# Patient Record
Sex: FEMALE | Race: WHITE | NOT HISPANIC OR LATINO | Employment: OTHER | URBAN - METROPOLITAN AREA
[De-identification: names, ages, dates, MRNs, and addresses within clinical notes are randomized per-mention and may not be internally consistent; named-entity substitution may affect disease eponyms.]

---

## 2017-03-27 ENCOUNTER — OFFICE VISIT (OUTPATIENT)
Dept: UROLOGY | Facility: CLINIC | Age: 77
End: 2017-03-27
Payer: MEDICARE

## 2017-03-27 VITALS — BODY MASS INDEX: 36.29 KG/M2 | WEIGHT: 245 LBS | HEIGHT: 69 IN

## 2017-03-27 DIAGNOSIS — N39.41 URGE INCONTINENCE: ICD-10-CM

## 2017-03-27 DIAGNOSIS — N39.0 RECURRENT UTI: Primary | ICD-10-CM

## 2017-03-27 PROCEDURE — 1159F MED LIST DOCD IN RCRD: CPT | Mod: S$GLB,,, | Performed by: UROLOGY

## 2017-03-27 PROCEDURE — 1125F AMNT PAIN NOTED PAIN PRSNT: CPT | Mod: S$GLB,,, | Performed by: UROLOGY

## 2017-03-27 PROCEDURE — 81002 URINALYSIS NONAUTO W/O SCOPE: CPT | Mod: S$GLB,,, | Performed by: UROLOGY

## 2017-03-27 PROCEDURE — 1160F RVW MEDS BY RX/DR IN RCRD: CPT | Mod: S$GLB,,, | Performed by: UROLOGY

## 2017-03-27 PROCEDURE — 1157F ADVNC CARE PLAN IN RCRD: CPT | Mod: S$GLB,,, | Performed by: UROLOGY

## 2017-03-27 PROCEDURE — 99999 PR PBB SHADOW E&M-EST. PATIENT-LVL III: CPT | Mod: PBBFAC,,, | Performed by: UROLOGY

## 2017-03-27 PROCEDURE — 99205 OFFICE O/P NEW HI 60 MIN: CPT | Mod: 25,S$GLB,, | Performed by: UROLOGY

## 2017-03-28 ENCOUNTER — TELEPHONE (OUTPATIENT)
Dept: UROLOGY | Facility: CLINIC | Age: 77
End: 2017-03-28

## 2017-03-28 DIAGNOSIS — N39.0 RECURRENT UTI: Primary | ICD-10-CM

## 2017-03-29 PROBLEM — N39.41 URGE INCONTINENCE: Status: ACTIVE | Noted: 2017-03-29

## 2017-03-29 PROBLEM — N39.0 RECURRENT UTI: Status: ACTIVE | Noted: 2017-03-29

## 2017-03-29 NOTE — PROGRESS NOTES
Note:  This is Faraz Conrad's sister    CHIEF COMPLAINT:    Mrs. Martínez is a 76 y.o. female presenting for recurrent UTI, referred by her brother Faraz Martines.    PRESENTING ILLNESS:    Rhiannon Martínez is a 76 y.o. female who has a history of colon cancer treated in  with resection.  No radiation therapy.  She has a problem with urge incontinence and she changes them frequently, even if they are just damp.  States she goes through 72 pads in about 3 1/2 days.  She has had urinary tract infections, with dysuria, no fevers or chills.  They often resolve without treatment.  She states the dysuria has been better lately.  She drinks cranberry juice to prevent UTI's.  She has difficulty ambulating due to bone spurs in her feet and uses a motorized wheel chair to come to clinic.      , hysterectomy, sexually active until 1 month ago when she started with the dysuria and urge incontinence, has an ileostomy secondary to history of colon cancer.      REVIEW OF SYSTEMS:    Review of Systems   Constitutional: Negative.  Negative for chills and fever.   HENT: Negative.    Eyes: Negative.    Respiratory: Negative.    Cardiovascular: Negative.    Gastrointestinal:        Sometimes gets dehydrated as she has an ileostomy     Genitourinary: Positive for dysuria and urgency.        Urge incontinence   Musculoskeletal: Positive for joint pain.   Skin:        Irritation from the pad use   Neurological: Negative.    Endo/Heme/Allergies: Bruises/bleeds easily (on Xarelto).   Psychiatric/Behavioral: Negative.      PATIENT HISTORY:    Past Medical History:   Diagnosis Date    Atrial fibrillation     Cancer     colon & rectum     Diabetes mellitus, type 2     Hernia     left abd     Hyperlipidemia     Hypertension     Thyroid disease     was on thyroid medication for 20 years and        Past Surgical History:   Procedure Laterality Date    CHOLECYSTECTOMY          COLON SURGERY      twisted colon dr [place]     HYSTERECTOMY      1969    TONSILLECTOMY         Family History   Problem Relation Age of Onset    Cancer Father      Social History    Marital status:      Occupational History    retired  restaurant owner       Social History Main Topics    Smoking status: Never Smoker    Smokeless tobacco: Not on file    Alcohol use 1.8 oz/week     1 Glasses of wine, 1 Cans of beer, 1 Shots of liquor per week      Comment: 2 beer per month    Drug use: No    Sexual activity: No       Allergies:  Review of patient's allergies indicates no active allergies.    Medications:  Outpatient Encounter Prescriptions as of 3/27/2017   Medication Sig Dispense Refill    alendronate (FOSAMAX) 70 MG tablet Take 1 tablet (70 mg total) by mouth every 7 days. 12 tablet 0    fenofibrate (TRICOR) 145 MG tablet Take 1 tablet (145 mg total) by mouth once daily. 90 tablet 0    liraglutide 0.6 mg/0.1 mL, 18 mg/3 mL, subq PNIJ (VICTOZA 2-CHANNING) 0.6 mg/0.1 mL (18 mg/3 mL) PnIj Inject 0.6 mg into the skin once daily. 3 mL 2    liraglutide 0.6 mg/0.1 mL, 18 mg/3 mL, subq PNIJ (VICTOZA 3-CHANNING) 0.6 mg/0.1 mL (18 mg/3 mL) PnIj Inject 1.2 mg into the skin once daily. 3 mL 2    metoprolol succinate (TOPROL-XL) 50 MG 24 hr tablet Take 1 tablet (50 mg total) by mouth once daily. 90 tablet 0    ostomy supplies Misc 1 each by Misc.(Non-Drug; Combo Route) route 2 (two) times daily. Pt uses natura drainable pouch with filter  2 3/4 inches 70 mm      ostomy supplies Misc 2 each by Misc.(Non-Drug; Combo Route) route 2 (two) times daily. Pt uses modable stomahesive skin barriers, 1 1/4 x 1 3/4 inches to 1 3/4 x 2 1/8 inches (2 3/4 inches 70 mm)      rivaroxaban (XARELTO) 15 mg Tab Take 1 tablet (15 mg total) by mouth once daily. 90 tablet 0     No facility-administered encounter medications on file as of 3/27/2017.          PHYSICAL EXAMINATION:    The patient generally appears in good health, is appropriately interactive, morbidly obese and is in  no apparent distress.    Skin: of the genitals is thickened and hyperpigmented.    Mental: Cooperative with normal affect.    Neuro: Grossly intact.    HEENT: Normal. No evidence of lymphadenopathy.    Chest: normal inspiratory effort.    Abdomen: Soft, non-tender. No masses or organomegaly. Bladder is not palpable. No evidence of flank discomfort. No evidence of inguinal hernia.    Extremities: No clubbing, cyanosis, or edema    Normal external female genitalia  Urethral meatus cannot cannulate with the 10 Fr catheter, while there are no masses, there is a purple bruised area around the meatus.  Access is difficult due to patient's body habitus and it is difficult for her to relax the pelvis.    Urethra and bladder are nontender to bimanual exam  Well supported anteriorly and posteriorly   Uterus and cervix are surgically absent  No adnexal masses    LABS:    UA 1.015, pH 5, ++ leuk, +++ blood, otherwise, negative    IMPRESSION:    Encounter Diagnoses   Name Primary?    Recurrent UTI Yes    Urge incontinence        PLAN:    1.  She states that she has had follow up for there colon cancer but I do not find any recent imaging studies.  Renal ultrasound ordered  2.  Voided specimen for culture  3.  Cystoscopy, possible bladder biopsy and fulguration under sedation.  Will add retrogrades if there is anything abnormal about the renal ultrasound.

## 2017-04-04 ENCOUNTER — HOSPITAL ENCOUNTER (OUTPATIENT)
Dept: RADIOLOGY | Facility: HOSPITAL | Age: 77
Discharge: HOME | End: 2017-04-04
Attending: UROLOGY
Payer: MEDICARE

## 2017-04-04 DIAGNOSIS — N39.0 RECURRENT UTI: ICD-10-CM

## 2017-04-04 PROCEDURE — 76770 US EXAM ABDO BACK WALL COMP: CPT | Mod: 26,,, | Performed by: RADIOLOGY

## 2017-04-04 PROCEDURE — 76770 US EXAM ABDO BACK WALL COMP: CPT | Mod: TC

## 2017-04-06 ENCOUNTER — ANESTHESIA EVENT (OUTPATIENT)
Dept: SURGERY | Facility: HOSPITAL | Age: 77
End: 2017-04-06
Payer: MEDICARE

## 2017-04-06 NOTE — ANESTHESIA PREPROCEDURE EVALUATION
Pre Admission Screening  Shea Maya RN      []Hide copied text  Anesthesia Assessment: Preoperative EQUATION     Planned Procedure: Procedure(s) (LRB):  CYSTOSCOPY W/BLADDER BIOPSY,POSSIBLE FULGURATION-BLADDER (N/A)  Requested Anesthesia Type:Monitor Anesthesia Care  Surgeon: Tierney Ramos MD  Service: Urology  Known or anticipated Date of Surgery:4/13/2017     Surgeon notes: reviewed     Electronic QUestionnaire Assessment completed via nurse interview with patient.                    Rhiannon Martínez Osmin [[id number]] - 76 y.o. Female         Providers Outside of [facility]             Pre-admit from 4/13/2017 in [facility]      Has outside Cardiologist   Yes [Dr Jarrett]        Surgical Risk Level       Surgical Risk Level:   2              caRDScore (Clinical Anesthesia Rapid Decision Score)         Low  Total Score: 15       15 Sum of Clinical Scores        caRDScores (Grouped)       caRDScore - Ane:   1                       caRDScore - CVD:   2                       caRDScore - Pul:   0                       caRDScore - Met:   7                       caRDScore - Phy:   5              caRDScore Items             Pre-admit from 4/13/2017 in [facility]      Anesthesia        Has/has had bowel disease of small or large intestine   Yes [Crohns disease-S/P colostomy]      CVD        Activity similar to best ability for maximal activity or exercise   METS 3      Diagnosed with high blood pressure   Yes      Typical BP runs <150/90   Yes      Pulmonary        Metabolic        Has been diagnosed with CKD   Yes [renal insufficiency BUN 35, creat 2.5]      Type 2 Diabetes   Yes [last A1c 6.3]      Is on oral Rx and/or non-insulin injectable for diabetes   Yes [takes victoza when BS > 130s--takes 3/week typically]      Has hyperlipoproteinemia   Yes      Physiologic        Obesity Status   Obesity (BMI >35-39.9)      Blood Thinner:  Rivaroxaban (Xarelto)   Yes      Had major  surgery for Ca with resection of vital organ tissue (Brain, Lung, Liver, Kidney-please specify)   Yes [colon resection and colostomy]      Has problems emptying bladder/ u. retent. due to nerve/prostate/bladder/pelvic dis.   Yes        Flags       Red Flag Score:   2                       Yellow Flag Score:   2              Red Flags             Pre-admit from 4/13/2017 in [facility]      Obesity Status   Obesity (BMI >35-39.9)      Blood Thinner:  Rivaroxaban (Xarelto)   Yes      Has been diagnosed with CKD   Yes [renal insufficiency BUN 35, creat 2.5]        Yellow Flags             Pre-admit from 4/13/2017 in [facility]      Obesity Status   Obesity (BMI >35-39.9)      Chronic Afib/Flutter   Yes      Has pain   Yes        PONV Risk Score (assumes periop narcotic use = +1, Max=4)       PONV Risk Score:   3              PONV Risk Factors  Total Score: 2       1 Female      1 Non-Smoker at present        Sleep Apnea  Total Score: 0         VANDANA STOP-Bang Risk Factors (Max=8)  Total Score: 3       1 Takes medication for high blood pressure      1 Obesity Status: Obesity (BMI >35)      1 Age >50        VANDANA Risk Level - 1 (Low), 2 (Moderate), 3 (High)       VANDANA Risk Level:   2              RCRI (Revised Cardiac Risk Indices of ACC/AHA guidelines, Max=6)  Total Score: 0         CAD Risk Factors  Total Score: 4       1 Female. Age >55      1 Diagnosed with high blood pressure      1 Has Diabetes      1 Has hyperlipoproteinemia        CHADS Score if applicable (history of atrial fib/flutter, Max=6)  Total Score: 3       1 Age >75      1 Diagnosed with high blood pressure      1 Has Diabetes        Maximal Exercise Capacity             Pre-admit from 4/13/2017 in [facility]      Maximal Exercise Capacity   METS 3        Summary of Dependence  Total Score: 1       1 Is totally independent of others for activities of daily living        Phone Fraility Score (Max =  17)  Total Score: 1       1 Uses 5 or more meds on reg basis        Pain Factors             Pre-admit from 4/13/2017 in [facility]      Has pain   Yes      Location and description of pain   foot pain/spurs      Typical Pain Scores   0 to 4      Not using strong pain medications   Yes        Risk Triggers (Evidence-Based Risk Triggers)         Pulmonary Risk Triggers  Total Score: 2       1 Age > or = 60      1 Obesity Status: Obesity (BMI >35-39.9)        Renal Risk Triggers  Total Score: 3       1 Diagnosed with high blood pressure      1 Has been diagnosed with CKD      1 Type 2 Diabetes        Delirium Risk Triggers  Total Score: 1       1 Age > or = 75        Urologic Risk Triggers  Total Score: 1       1 Has problems emptying bladder/ u. retent. due to nerve/prostate/bladder/pelvic dis.        Logistics         Pre-op Clinic Logistics  Total Score: 9       1 Has outside Cardiologist      1 Has had anesthesia, either as adult or as a child      3 Blood Thinner:  Rivaroxaban (Xarelto)      1 Takes medication for high blood pressure      2 Chronic Afib/Flutter      1 Has been diagnosed with CKD        DOSC Logistics  Total Score: 3       2 Blood Thinner:  Rivaroxaban (Xarelto)      1 Has been diagnosed with CKD        Discharge Logistics  Total Score: 1       1 Functional capacity limit: METS 3        Discharge Planning             Pre-admit from 4/13/2017 in [facility]      Discharge Planning        Will assist patient 24/7, if needed         Who will transport you to therapy, if need           Anes <For office use only>       Total Score: 12          Surgical Risk Level Assessment                       Triage considerations:      The patient has no apparent active cardiac condition (No unstable coronary Syndrome such as severe unstable angina or recent [<1 month] myocardial infarction, decompensated CHF, severe valvular disease or significant  arrhythmia)     Previous anesthesia records:GETA, MAC, No problems and Not available     Last PCP note: within 3 months , within [facility]  2/9/17 Dr Moreno  Subspecialty notes: Cardiology: General     Other important co-morbidities: A fib, HTN/HLP, DM2, HX colon/rectal cancer, renal insufficiency, obesity      Tests already available:  Available tests, within 3 months , within [facility] . 2/7 CMP. 10/2016 A1c. 1/2015 EKG      Instructions given. (See in Nurse's note)     Optimization:  Anesthesia Preop Clinic Assessment Indicated-not required for this procedure  Medical Opinion Indicated-will ask Dr Jarrett for Xarelto instructions          Plan:   Testing: none  Consultation:cards for xarelto instructions-scanned to media 4/12  Patient has previously scheduled Medical Appointment:none     Navigation:   Consults scheduled.  Results will be tracked by Preop Clinic.          Electronically signed by Shea Maya RN at 4/6/2017  9:13 AM                                                                                                                      04/06/2017  Rhiannon Martínez is a 76 y.o., female.    [facility] Anesthesia Evaluation    I have reviewed the Patient Summary Reports.     I have reviewed the Nursing Notes.      Review of Systems  Anesthesia Hx:  No previous Anesthesia    Social:  Non-Smoker    Hematology/Oncology:  Hematology Normal   Oncology Normal     Pulmonary:  Pulmonary Normal    Renal/:   Incontinence   Hepatic/GI:  Hepatic/GI Normal    Musculoskeletal:  Musculoskeletal Normal    OB/GYN/PEDS:  Legal Guardian is Mother , birth was Full Term Denies Developmental Delay Denies Anomilies    Neurological:  Neurology Normal    Endocrine:   Diabetes, well controlled, type 2    Dermatological:  Skin Normal        Physical Exam  General:  Obesity, Well nourished    Airway/Jaw/Neck:  Airway Findings: Mouth Opening: Normal Tongue: Normal  General Airway Assessment: Adult  Mallampati: II  Improves to II with  phonation.  TM Distance: Normal, at least 6 cm      Dental:  Dental Findings: In tact   Chest/Lungs:  Chest/Lungs Findings: Clear to auscultation     Heart/Vascular:  Heart Findings: Rate: Normal  Rhythm: Regular Rhythm  Sounds: Normal        Mental Status:  Mental Status Findings:  Cooperative, Alert and Oriented         Anesthesia Plan  Type of Anesthesia, risks & benefits discussed:  Anesthesia Type:  general, MAC  Patient's Preference:   Intra-op Monitoring Plan:   Intra-op Monitoring Plan Comments:   Post Op Pain Control Plan:   Post Op Pain Control Plan Comments:   Induction:   IV  Beta Blocker:  Patient is on a Beta-Blocker and has received one dose within the past 24 hours (No further documentation required).       Informed Consent: Patient understands risks and agrees with Anesthesia plan.  Questions answered. Anesthesia consent signed with patient.  ASA Score: 3     Day of Surgery Review of History & Physical: I have interviewed and examined the patient. I have reviewed the patient's H&P dated:            Ready For Surgery From Anesthesia Perspective.     Patient Active Problem List   Diagnosis    HTN (hypertension)    Hypercholesteremia    DM (diabetes mellitus)    A-fib    H/O colostomy    Diabetes mellitus, type 2    Hyperlipidemia    Cancer    Atrial fibrillation    BMI 35.0-35.9,adult    Essential hypertension    Non-insulin dependent type 2 diabetes mellitus    Urge incontinence    Recurrent UTI

## 2017-04-06 NOTE — PRE ADMISSION SCREENING
Anesthesia Assessment: Preoperative EQUATION    Planned Procedure: Procedure(s) (LRB):  CYSTOSCOPY W/BLADDER BIOPSY,POSSIBLE FULGURATION-BLADDER (N/A)  Requested Anesthesia Type:Monitor Anesthesia Care  Surgeon: Tierney Ramos MD  Service: Urology  Known or anticipated Date of Surgery:4/13/2017    Surgeon notes: reviewed    Electronic QUestionnaire Assessment completed via nurse interview with patient.        Rhiannon Martínez [[id number]] - 76 y.o. Female        Providers Outside of [facility]           Pre-admit from 4/13/2017 in [facility]     Has outside Cardiologist  Yes [Dr Jarrett]       Surgical Risk Level      Surgical Risk Level:  2           caRDScore (Clinical Anesthesia Rapid Decision Score)        Low  Total Score: 15      15 Sum of Clinical Scores       caRDScores (Grouped)      caRDScore - Ane:  1                caRDScore - CVD:  2                caRDScore - Pul:  0                caRDScore - Met:  7                caRDScore - Phy:  5           caRDScore Items           Pre-admit from 4/13/2017 in [facility]     Anesthesia      Has/has had bowel disease of small or large intestine  Yes [Crohns disease-S/P colostomy]     CVD      Activity similar to best ability for maximal activity or exercise  METS 3     Diagnosed with high blood pressure  Yes     Typical BP runs <150/90  Yes     Pulmonary      Metabolic      Has been diagnosed with CKD  Yes [renal insufficiency BUN 35, creat 2.5]     Type 2 Diabetes  Yes [last A1c 6.3]     Is on oral Rx and/or non-insulin injectable for diabetes  Yes [takes victoza when BS > 130s--takes 3/week typically]     Has hyperlipoproteinemia  Yes     Physiologic      Obesity Status  Obesity (BMI >35-39.9)     Blood Thinner:  Rivaroxaban (Xarelto)  Yes     Had major surgery for Ca with resection of vital organ tissue (Brain, Lung, Liver, Kidney-please specify)  Yes [colon resection and colostomy]     Has problems emptying bladder/ u.  retent. due to nerve/prostate/bladder/pelvic dis.  Yes       Flags      Red Flag Score:  2                Yellow Flag Score:  2           Red Flags           Pre-admit from 4/13/2017 in [facility]     Obesity Status  Obesity (BMI >35-39.9)     Blood Thinner:  Rivaroxaban (Xarelto)  Yes     Has been diagnosed with CKD  Yes [renal insufficiency BUN 35, creat 2.5]       Yellow Flags           Pre-admit from 4/13/2017 in [facility]     Obesity Status  Obesity (BMI >35-39.9)     Chronic Afib/Flutter  Yes     Has pain  Yes       PONV Risk Score (assumes periop narcotic use = +1, Max=4)      PONV Risk Score:  3           PONV Risk Factors  Total Score: 2      1 Female     1 Non-Smoker at present       Sleep Apnea  Total Score: 0        VANDANA STOP-Bang Risk Factors (Max=8)  Total Score: 3      1 Takes medication for high blood pressure     1 Obesity Status: Obesity (BMI >35)     1 Age >50       VANDANA Risk Level - 1 (Low), 2 (Moderate), 3 (High)      VANDANA Risk Level:  2           RCRI (Revised Cardiac Risk Indices of ACC/AHA guidelines, Max=6)  Total Score: 0        CAD Risk Factors  Total Score: 4      1 Female. Age >55     1 Diagnosed with high blood pressure     1 Has Diabetes     1 Has hyperlipoproteinemia       CHADS Score if applicable (history of atrial fib/flutter, Max=6)  Total Score: 3      1 Age >75     1 Diagnosed with high blood pressure     1 Has Diabetes       Maximal Exercise Capacity           Pre-admit from 4/13/2017 in [facility]     Maximal Exercise Capacity  METS 3       Summary of Dependence  Total Score: 1      1 Is totally independent of others for activities of daily living       Phone Fraility Score (Max = 17)  Total Score: 1      1 Uses 5 or more meds on reg basis       Pain Factors           Pre-admit from 4/13/2017 in [facility]     Has pain  Yes     Location and description of pain  foot pain/spurs     Typical Pain Scores   0 to 4     Not using strong pain medications  Yes       Risk Triggers (Evidence-Based Risk Triggers)        Pulmonary Risk Triggers  Total Score: 2      1 Age > or = 60     1 Obesity Status: Obesity (BMI >35-39.9)       Renal Risk Triggers  Total Score: 3      1 Diagnosed with high blood pressure     1 Has been diagnosed with CKD     1 Type 2 Diabetes       Delirium Risk Triggers  Total Score: 1      1 Age > or = 75       Urologic Risk Triggers  Total Score: 1      1 Has problems emptying bladder/ u. retent. due to nerve/prostate/bladder/pelvic dis.       Logistics        Pre-op Clinic Logistics  Total Score: 9      1 Has outside Cardiologist     1 Has had anesthesia, either as adult or as a child     3 Blood Thinner:  Rivaroxaban (Xarelto)     1 Takes medication for high blood pressure     2 Chronic Afib/Flutter     1 Has been diagnosed with CKD       DOSC Logistics  Total Score: 3      2 Blood Thinner:  Rivaroxaban (Xarelto)     1 Has been diagnosed with CKD       Discharge Logistics  Total Score: 1      1 Functional capacity limit: METS 3       Discharge Planning           Pre-admit from 4/13/2017 in [facility]     Discharge Planning      Will assist patient 24/7, if needed       Who will transport you to therapy, if need         Anes <For office use only>      Total Score: 12        Surgical Risk Level Assessment                 Triage considerations:     The patient has no apparent active cardiac condition (No unstable coronary Syndrome such as severe unstable angina or recent [<1 month] myocardial infarction, decompensated CHF, severe valvular   disease or significant arrhythmia)    Previous anesthesia records:GETA, MAC, No problems and Not available    Last PCP note: within 3 months , within [facility]  2/9/17 Dr Moreno  Subspecialty notes: Cardiology: General    Other important co-morbidities: A fib, HTN/HLP, DM2, HX colon/rectal cancer, renal insufficiency, obesity     Tests  already available:  Available tests,  within 3 months , within [facility] . 2/7 CMP. 10/2016 A1c. 1/2015 EKG            Instructions given. (See in Nurse's note)    Optimization:  Anesthesia Preop Clinic Assessment  Indicated-not required for this procedure    Medical Opinion Indicated-will ask Dr Jarrett for Xarelto instructions           Plan:    Testing:  BMP     Consultation:cards for xarelto instructions     Patient  has previously scheduled Medical Appointment:none    Navigation: Tests Scheduled. Am of surgery             Consults scheduled.             Results will be tracked by Preop Clinic.

## 2017-04-12 ENCOUNTER — TELEPHONE (OUTPATIENT)
Dept: UROLOGY | Facility: CLINIC | Age: 77
End: 2017-04-12

## 2017-04-12 NOTE — TELEPHONE ENCOUNTER
Called pt to confirm 11am arrival time for procedure. Gave pt NPO instructions and gave pt opportunity to ask questions. Pt verbalized understanding.

## 2017-04-13 ENCOUNTER — HOSPITAL ENCOUNTER (OUTPATIENT)
Facility: HOSPITAL | Age: 77
Discharge: HOME | End: 2017-04-13
Attending: UROLOGY | Admitting: UROLOGY
Payer: MEDICARE

## 2017-04-13 ENCOUNTER — ANESTHESIA (OUTPATIENT)
Dept: SURGERY | Facility: HOSPITAL | Age: 77
End: 2017-04-13
Payer: MEDICARE

## 2017-04-13 ENCOUNTER — SURGERY (OUTPATIENT)
Age: 77
End: 2017-04-13

## 2017-04-13 VITALS
DIASTOLIC BLOOD PRESSURE: 84 MMHG | RESPIRATION RATE: 16 BRPM | HEART RATE: 100 BPM | BODY MASS INDEX: 38.45 KG/M2 | SYSTOLIC BLOOD PRESSURE: 133 MMHG | OXYGEN SATURATION: 100 % | TEMPERATURE: 98 F | HEIGHT: 67 IN | WEIGHT: 245 LBS

## 2017-04-13 DIAGNOSIS — N39.0 RECURRENT UTI: ICD-10-CM

## 2017-04-13 LAB
POCT GLUCOSE: 104 MG/DL (ref 70–110)
POCT GLUCOSE: 110 MG/DL (ref 70–110)

## 2017-04-13 PROCEDURE — 37000009 HC ANESTHESIA EA ADD 15 MINS: Performed by: UROLOGY

## 2017-04-13 PROCEDURE — 71000016 HC POSTOP RECOV ADDL HR: Performed by: UROLOGY

## 2017-04-13 PROCEDURE — 25000003 PHARM REV CODE 250: Performed by: NURSE ANESTHETIST, CERTIFIED REGISTERED

## 2017-04-13 PROCEDURE — 37000008 HC ANESTHESIA 1ST 15 MINUTES: Performed by: UROLOGY

## 2017-04-13 PROCEDURE — 63600175 PHARM REV CODE 636 W HCPCS: Performed by: NURSE ANESTHETIST, CERTIFIED REGISTERED

## 2017-04-13 PROCEDURE — 25000003 PHARM REV CODE 250: Performed by: STUDENT IN AN ORGANIZED HEALTH CARE EDUCATION/TRAINING PROGRAM

## 2017-04-13 PROCEDURE — 25000003 PHARM REV CODE 250: Performed by: UROLOGY

## 2017-04-13 PROCEDURE — 82962 GLUCOSE BLOOD TEST: CPT | Performed by: UROLOGY

## 2017-04-13 PROCEDURE — D9220A PRA ANESTHESIA: Mod: CRNA,,, | Performed by: NURSE ANESTHETIST, CERTIFIED REGISTERED

## 2017-04-13 PROCEDURE — 52000 CYSTOURETHROSCOPY: CPT | Mod: ,,, | Performed by: UROLOGY

## 2017-04-13 PROCEDURE — 36000706: Performed by: UROLOGY

## 2017-04-13 PROCEDURE — 36000707: Performed by: UROLOGY

## 2017-04-13 PROCEDURE — D9220A PRA ANESTHESIA: Mod: ANES,,, | Performed by: ANESTHESIOLOGY

## 2017-04-13 PROCEDURE — 71000015 HC POSTOP RECOV 1ST HR: Performed by: UROLOGY

## 2017-04-13 RX ORDER — SODIUM CHLORIDE 9 MG/ML
INJECTION, SOLUTION INTRAVENOUS CONTINUOUS PRN
Status: DISCONTINUED | OUTPATIENT
Start: 2017-04-13 | End: 2017-04-13

## 2017-04-13 RX ORDER — FENTANYL CITRATE 50 UG/ML
INJECTION, SOLUTION INTRAMUSCULAR; INTRAVENOUS
Status: DISCONTINUED | OUTPATIENT
Start: 2017-04-13 | End: 2017-04-13

## 2017-04-13 RX ORDER — MIDAZOLAM HYDROCHLORIDE 1 MG/ML
INJECTION, SOLUTION INTRAMUSCULAR; INTRAVENOUS
Status: DISCONTINUED | OUTPATIENT
Start: 2017-04-13 | End: 2017-04-13

## 2017-04-13 RX ORDER — LIDOCAINE HCL/PF 100 MG/5ML
SYRINGE (ML) INTRAVENOUS
Status: DISCONTINUED | OUTPATIENT
Start: 2017-04-13 | End: 2017-04-13

## 2017-04-13 RX ORDER — LIDOCAINE HYDROCHLORIDE 20 MG/ML
JELLY TOPICAL
Status: DISCONTINUED | OUTPATIENT
Start: 2017-04-13 | End: 2017-04-13 | Stop reason: HOSPADM

## 2017-04-13 RX ORDER — PROPOFOL 10 MG/ML
VIAL (ML) INTRAVENOUS CONTINUOUS PRN
Status: DISCONTINUED | OUTPATIENT
Start: 2017-04-13 | End: 2017-04-13

## 2017-04-13 RX ORDER — AMOXICILLIN AND CLAVULANATE POTASSIUM 500; 125 MG/1; MG/1
1 TABLET, FILM COATED ORAL 2 TIMES DAILY
Qty: 6 TABLET | Refills: 0 | Status: SHIPPED | OUTPATIENT
Start: 2017-04-13 | End: 2017-04-16

## 2017-04-13 RX ADMIN — FENTANYL CITRATE 50 MCG: 50 INJECTION, SOLUTION INTRAMUSCULAR; INTRAVENOUS at 11:04

## 2017-04-13 RX ADMIN — PROPOFOL 70 MCG/KG/MIN: 10 INJECTION, EMULSION INTRAVENOUS at 11:04

## 2017-04-13 RX ADMIN — LIDOCAINE HYDROCHLORIDE 100 MG: 20 INJECTION, SOLUTION INTRAVENOUS at 11:04

## 2017-04-13 RX ADMIN — MIDAZOLAM HYDROCHLORIDE 2 MG: 1 INJECTION, SOLUTION INTRAMUSCULAR; INTRAVENOUS at 11:04

## 2017-04-13 RX ADMIN — SODIUM CHLORIDE: 0.9 INJECTION, SOLUTION INTRAVENOUS at 11:04

## 2017-04-13 RX ADMIN — LIDOCAINE HYDROCHLORIDE 10 ML: 20 JELLY TOPICAL at 11:04

## 2017-04-13 RX ADMIN — Medication 2 G: at 11:04

## 2017-04-13 NOTE — ANESTHESIA POSTPROCEDURE EVALUATION
"Anesthesia Post Evaluation    Patient: Rhiannon Martínez    Procedure(s) Performed: Procedure(s):  CYSTOSCOPY    Final Anesthesia Type: MAC  Patient location during evaluation: PACU  Patient participation: Yes- Able to Participate  Level of consciousness: awake and alert  Post-procedure vital signs: reviewed and stable  Pain management: adequate  Airway patency: patent  PONV status at discharge: No PONV  Anesthetic complications: no      Cardiovascular status: blood pressure returned to baseline  Respiratory status: unassisted, room air and spontaneous ventilation  Hydration status: euvolemic  Follow-up not needed.        Visit Vitals    /84    Pulse 88    Temp 36.6 °C (97.9 °F) (Oral)    Resp 16    Ht 5' 7" (1.702 m)    Wt 111.1 kg (245 lb)    SpO2 100%    Breastfeeding No    BMI 38.37 kg/m2       Pain/Mauricio Score: Pain Assessment Performed: Yes (4/13/2017 12:30 PM)  Presence of Pain: denies (4/13/2017  2:30 PM)  Mauricio Score: 10 (4/13/2017  2:30 PM)      "

## 2017-04-13 NOTE — INTERVAL H&P NOTE
The patient has been examined and the H&P has been reviewed:    I concur with the findings and no changes have occurred since H&P was written.   To OR today for cystoscopy with possible bladder biopsy and fulguration     Anesthesia/Surgery risks, benefits and alternative options discussed and understood by patient/family.          Active Hospital Problems    Diagnosis  POA    Recurrent UTI [N39.0]  Yes      Resolved Hospital Problems    Diagnosis Date Resolved POA   No resolved problems to display.     Patient seen in holding.  No changes in clinical condition.  Proceed with planned procedure.

## 2017-04-13 NOTE — IP AVS SNAPSHOT
[facility]  [address]  [facility] [address]  Phone: [phone]           Patient Discharge Instructions   Our goal is to set you up for success. This packet includes information on your condition, medications, and your home care.  It will help you care for yourself to prevent having to return to the hospital.     Please ask your nurse if you have any questions.      There are many details to remember when preparing to leave the hospital. Here is what you will need to do:    1. Take your medicine. If you are prescribed medications, review your Medication List on the following pages. You may have new medications to  at the pharmacy and others that you'll need to stop taking. Review the instructions for how and when to take your medications. Talk with your doctor or nurses if you are unsure of what to do.     2. Go to your follow-up appointments. Specific follow-up information is listed in the following pages. Your may be contacted by a nurse or clinical provider about future appointments. Be sure we have all of the phone numbers to reach you. Please contact your provider's office if you are unable to make an appointment.     3. Watch for warning signs. Your doctor or nurse will give you detailed warning signs to watch for and when to call for assistance. These instructions may also include educational information about your condition. If you experience any of warning signs to your health, call your doctor.           [facility]  Unless otherwise directed by your provider, please   contact [facility], our nurse care line   that is available for 24/7 assistance.     [phone] (toll-free)     Registered nurses in the [facility]   provide: appointment scheduling, clinical advisement, health education, and other advisory services.                  ** Verify the list of medication(s) below is accurate and up to date. Carry this with you in case of  emergency. If your medications have changed, please notify your healthcare provider.             Medication List      START taking these medications        Additional Info                      amoxicillin-clavulanate 500-125mg 500-125 mg Tab   Commonly known as:  AUGMENTIN   Quantity:  6 tablet   Refills:  0   Dose:  1 tablet    Instructions:  Take 1 tablet (500 mg total) by mouth 2 (two) times daily.     Begin Date    AM    Noon    PM    Bedtime         CONTINUE taking these medications        Additional Info                      alendronate 70 MG tablet   Commonly known as:  FOSAMAX   Quantity:  12 tablet   Refills:  0   Dose:  70 mg    Instructions:  Take 1 tablet (70 mg total) by mouth every 7 days.     Begin Date    AM    Noon    PM    Bedtime       fenofibrate 145 MG tablet   Commonly known as:  TRICOR   Quantity:  90 tablet   Refills:  0   Dose:  145 mg    Instructions:  Take 1 tablet (145 mg total) by mouth once daily.     Begin Date    AM    Noon    PM    Bedtime       * liraglutide 0.6 mg/0.1 mL (18 mg/3 mL) subq PNIJ 0.6 mg/0.1 mL (18 mg/3 mL) Pnij   Commonly known as:  VICTOZA 3-CHANNING   Quantity:  3 mL   Refills:  2   Dose:  1.2 mg    Instructions:  Inject 1.2 mg into the skin once daily.     Begin Date    AM    Noon    PM    Bedtime       * liraglutide 0.6 mg/0.1 mL (18 mg/3 mL) subq PNIJ 0.6 mg/0.1 mL (18 mg/3 mL) Pnij   Commonly known as:  VICTOZA 2-CHANNING   Quantity:  3 mL   Refills:  2   Dose:  0.6 mg    Instructions:  Inject 0.6 mg into the skin once daily.     Begin Date    AM    Noon    PM    Bedtime       metoprolol succinate 50 MG 24 hr tablet   Commonly known as:  TOPROL-XL   Quantity:  90 tablet   Refills:  0   Dose:  50 mg    Instructions:  Take 1 tablet (50 mg total) by mouth once daily.     Begin Date    AM    Noon    PM    Bedtime       * ostomy supplies Misc   Refills:  0   Dose:  1 each    Instructions:  1 each by Misc.(Non-Drug; Combo Route) route 2 (two) times daily. Pt uses natura  drainable pouch with filter  2 3/4 inches 70 mm     Begin Date    AM    Noon    PM    Bedtime       * ostomy supplies Misc   Refills:  0   Dose:  2 each    Instructions:  2 each by Misc.(Non-Drug; Combo Route) route 2 (two) times daily. Pt uses modable stomahesive skin barriers, 1 1/4 x 1 3/4 inches to 1 3/4 x 2 1/8 inches (2 3/4 inches 70 mm)     Begin Date    AM    Noon    PM    Bedtime       rivaroxaban 15 mg Tab   Commonly known as:  XARELTO   Quantity:  90 tablet   Refills:  0   Dose:  15 mg    Instructions:  Take 1 tablet (15 mg total) by mouth once daily.     Begin Date    AM    Noon    PM    Bedtime       * Notice:  This list has 4 medication(s) that are the same as other medications prescribed for you. Read the directions carefully, and ask your doctor or other care provider to review them with you.         Where to Get Your Medications      You can get these medications from any pharmacy     Bring a paper prescription for each of these medications     amoxicillin-clavulanate 500-125mg 500-125 mg Tab                  Please bring to all follow up appointments:    1. A copy of your discharge instructions.  2. All medicines you are currently taking in their original bottles.  3. Identification and insurance card.    Please arrive 15 minutes ahead of scheduled appointment time.    Please call 24 hours in advance if you must reschedule your appointment and/or time.        Your Scheduled Appointments     Apr 26, 2017  1:40 PM CDT   Post OP with MD Taj Thomas [facility] - U[facility] 4th Floor ([facility] )    [address]  [facility] [address] [phone]              Follow-up Information     Follow up with Tierney Ramos MD In 2 weeks.    Specialty:  Urology    Why:  post op cystoscopy     Contact information:    [address] [id number] [phone]          Discharge Instructions     Future Orders    Activity as tolerated     Call MD for:  difficulty breathing  or increased cough     Call MD for:  increased confusion or weakness     Call MD for:  persistent dizziness, light-headedness, or visual disturbances     Call MD for:  persistent nausea and vomiting or diarrhea     Call MD for:  redness, tenderness, or signs of infection (pain, swelling, redness, odor or green/yellow discharge around incision site)     Call MD for:  severe persistent headache     Call MD for:  severe uncontrolled pain     Call MD for:  temperature >100.4     Call MD for:  worsening rash     Diet general     Questions:    Total calories:      Fat restriction, if any:      Protein restriction, if any:      Na restriction, if any:      Fluid restriction:      Additional restrictions:      No dressing needed         Discharge Instructions         Elizabeth Catheter Care    A Elizabeth catheter is a rubber tube that is placed through the urethra (opening where urine comes out) and into the bladder. This helps drain urine from the bladder. There is a small balloon on the end of the tube that is inflated after insertion. This keeps the catheter from sliding out of the bladder.  A Elizabeth catheter is used to treat urinary retention (unable to pass urine). It is also used when there is incontinence (loss of bladder control).  Home care  · Finish taking any prescribed antibiotic even if you are feeling better before then.  · It is important to keep bacteria from getting into the collection bag. Do not disconnect the catheter from the collection bag.  · Use a leg band to secure the drainage tube, so it does not pull on the catheter. Drain the collection bag when it becomes full using the drain spout at the bottom of the bag.  · Do not try to pull or remove your catheter. This will injure your urethra. It must be removed by your healthcare provider or nurse.  Follow-up care  Follow up with your healthcare provider as advised for repeat urine testing and catheter removal or replacement.  When to seek medical advice  Call  your healthcare provider right away if any of these occur:  · Fever of 100.4ºF (38ºC) or higher, or as directed by your healthcare provider  · Bladder pain or fullness  · Abdominal swelling, nausea or vomiting, or back pain  · Blood or urine leakage around the catheter  · Bloody urine coming from the catheter (if a new symptom)  · Catheter falls out  · Catheter stops draining for 6 hours  · Weakness, dizziness, or fainting  Date Last Reviewed: 10/1/2016  © 7614-4092 [employer]. [address] [place] [address]. All rights reserved. This information is not intended as a substitute for professional medical care. Always follow your healthcare professional's instructions.        Cystoscopy    Cystoscopy is a procedure that lets your doctor look directly inside your urethra and bladder. It can be used to:  · Help diagnose a problem with your urethra, bladder, or kidneys.  · Take a sample (biopsy) of bladder or urethral tissue.  · Treat certain problems (such as removing kidney stones).  · Place a stent to bypass an obstruction.  · Take special X-rays of the kidneys.  Based on the findings, your doctor may recommend other tests or treatments.  What is a cystoscope?  A cystoscope is a telescope-like instrument that contains lenses and fiberoptics (small glass wires that make bright light). The cystoscope may be straight and rigid, or flexible to bend around curves in the urethra. The doctor may look directly into the cystoscope, or project the image onto a monitor.  Getting ready  · Ask your doctor if you should stop taking any medications prior to the procedure.  · Ask whether you should avoid eating or drinking anything after midnight before the procedure.  · Follow any other instructions your doctor gives you.  Tell your doctor before the exam if you:  · Take any medications, such as aspirin or blood thinners  · Have allergies to any medications  · Are pregnant   The procedure  Cystoscopy is done  in the doctors office or hospital. The doctor and a nurse are present during the procedure. It takes only a few minutes, longer if a biopsy, X-ray, or treatment needs to be done.  During the procedure:  · You lie on an exam table on your back, knees bent and legs apart. You are covered with a drape.  · Your urethra and the area around it are washed. Anesthetic jelly may be applied to numb the urethra. Other pain medication is usually not needed. In some cases, you may be offered a mild sedative to help you relax. If a more extensive procedure is to be done, such as a biopsy or kidney stone removal, general anesthesia may be needed.  · The cystoscope is inserted. A sterile fluid is put into the bladder to expand it. You may feel pressure from this fluid.  · When the procedure is done, the cystoscope is removed.  After the procedure  If you had a sedative, general anesthesia, or spinal anesthesia, you must have someone drive you home. Once youre home:  · Drink plenty of fluids.  · You may have burning or light bleeding when you urinate--this is normal.  · Medications may be prescribed to ease any discomfort or prevent infection. Take these as directed.  · Call your doctor if you have heavy bleeding or blood clots, burning that lasts more than a day, a fever over 100°F  (38° C), or trouble urinating.  Date Last Reviewed: 9/8/2014  © 2342-4312 [employer]. [address] [place] [address]. All rights reserved. This information is not intended as a substitute for professional medical care. Always follow your healthcare professional's instructions.            Primary Diagnosis     Your primary diagnosis was:  Recurrent Uti      Admission Information     Date & Time Provider Department CSN    4/13/2017 10:31 AM Tierney Ramos MD [facility] [id number]      Care Providers     Provider Role Specialty Primary office phone    Tierney Ramos MD Attending Provider Urology  "[phone]    Tierney Ramos MD Surgeon  Urology [phone]      Your Vitals Were     BP Pulse Temp Resp Height Weight    134/78 100 97.9 °F (36.6 °C) (Oral) 16 5' 7" (1.702 m) 111.1 kg (245 lb)    SpO2 BMI             100% 38.37 kg/m2         Recent Lab Values        3/7/2006 5/9/2006 1/30/2007 8/15/2008 1/16/2009 8/31/2009 1/8/2010         3:50 PM  2:09 PM 11:05 AM 10:32 AM 11:25 AM 11:44 AM  6:45 AM     A1C 7.3 (H) 6.5 (H) 7.2 (H) 6.5 (H) 6.8 (H) 6.8 (H) 6.8 (H)           Allergies as of 4/13/2017     No Known Allergies      Advance Directives     An advance directive is a document which, in the event you are no longer able to make decisions for yourself, tells your healthcare team what kind of treatment you do or do not want to receive, or who you would like to make those decisions for you.  If you do not currently have an advance directive, [facility] encourages you to create one.  For more information call:  [phone]), [phone] ([phone]),  or log on to [email or web address].        Language Assistance Services     ATTENTION: Language assistance services are available, free of charge. Please call [phone].      ATENCIÓN: Si habla español, tiene a jones disposición servicios gratuitos de asistencia lingüística. Llame al [phone].     CHÚ Ý: N?u b?n nói Ti?ng Vi?t, có các d?ch v? h? tr? ngôn ng? mi?n phí dành cho b?n. G?i s? [phone].        Diabetes Discharge Instructions                                   [employer] Information            [facility] complies with applicable Federal civil rights laws and does not discriminate on the basis of race, color, national origin, age, disability, or sex.        "

## 2017-04-13 NOTE — DISCHARGE SUMMARY
[facility]  Discharge Note  Short Stay    Admit Date: 4/13/2017    Discharge Date and Time: 4/13/2017       Attending Physician: Tierney Ramos MD     Discharge Provider: Steven Hinkle MD    Diagnoses:  Active Hospital Problems    Diagnosis  POA    *Recurrent UTI [N39.0]  Yes    Urge incontinence [N39.41]  Yes    Essential hypertension [I10]  Yes     Chronic    Non-insulin dependent type 2 diabetes mellitus [E11.9]  Yes     Chronic    BMI 35.0-35.9,adult [Z68.35]  Not Applicable    Atrial fibrillation [I48.91]  Yes    Diabetes mellitus, type 2 [E11.9]  Yes    Hyperlipidemia [E78.5]  Yes    HTN (hypertension) [I10]  Yes     Chronic    Hypercholesteremia [E78.00]  Yes     Chronic    DM (diabetes mellitus) [E11.9]  Yes     Chronic    A-fib [I48.91]  Yes     Chronic    H/O colostomy [Z98.890]  Not Applicable     Chronic      Resolved Hospital Problems    Diagnosis Date Resolved POA   No resolved problems to display.       Discharged Condition: good    Hospital Course: Patient was admitted for a cystoscopy and tolerated the procedure well with no complications.    Final Diagnoses: Same as principal problem.    Disposition: Home or Self Care    Follow up/Patient Instructions:    Medications:  Reconciled Home Medications:   Current Discharge Medication List      START taking these medications    Details   amoxicillin-clavulanate 500-125mg (AUGMENTIN) 500-125 mg Tab Take 1 tablet (500 mg total) by mouth 2 (two) times daily.  Qty: 6 tablet, Refills: 0         CONTINUE these medications which have NOT CHANGED    Details   alendronate (FOSAMAX) 70 MG tablet Take 1 tablet (70 mg total) by mouth every 7 days.  Qty: 12 tablet, Refills: 0    Associated Diagnoses: Osteoporosis      fenofibrate (TRICOR) 145 MG tablet Take 1 tablet (145 mg total) by mouth once daily.  Qty: 90 tablet, Refills: 0    Associated Diagnoses: Hypertriglyceridemia      !! liraglutide 0.6 mg/0.1 mL, 18 mg/3 mL, subq PNIJ  (VICTOZA 2-CHANNING) 0.6 mg/0.1 mL (18 mg/3 mL) PnIj Inject 0.6 mg into the skin once daily.  Qty: 3 mL, Refills: 2    Associated Diagnoses: Non-insulin dependent type 2 diabetes mellitus      !! liraglutide 0.6 mg/0.1 mL, 18 mg/3 mL, subq PNIJ (VICTOZA 3-CHANNING) 0.6 mg/0.1 mL (18 mg/3 mL) PnIj Inject 1.2 mg into the skin once daily.  Qty: 3 mL, Refills: 2    Associated Diagnoses: Diabetes mellitus due to underlying condition without complication, without long-term current use of insulin      metoprolol succinate (TOPROL-XL) 50 MG 24 hr tablet Take 1 tablet (50 mg total) by mouth once daily.  Qty: 90 tablet, Refills: 0    Associated Diagnoses: Essential hypertension      rivaroxaban (XARELTO) 15 mg Tab Take 1 tablet (15 mg total) by mouth once daily.  Qty: 90 tablet, Refills: 0    Associated Diagnoses: Chronic atrial fibrillation      !! ostomy supplies Misc 1 each by Misc.(Non-Drug; Combo Route) route 2 (two) times daily. Pt uses natura drainable pouch with filter  2 3/4 inches 70 mm      !! ostomy supplies Misc 2 each by Misc.(Non-Drug; Combo Route) route 2 (two) times daily. Pt uses modable stomahesive skin barriers, 1 1/4 x 1 3/4 inches to 1 3/4 x 2 1/8 inches (2 3/4 inches 70 mm)       !! - Potential duplicate medications found. Please discuss with provider.          Discharge Procedure Orders  Diet general     Activity as tolerated     Call MD for:  temperature >100.4     Call MD for:  persistent nausea and vomiting or diarrhea     Call MD for:  severe uncontrolled pain     Call MD for:  redness, tenderness, or signs of infection (pain, swelling, redness, odor or green/yellow discharge around incision site)     Call MD for:  difficulty breathing or increased cough     Call MD for:  severe persistent headache     Call MD for:  worsening rash     Call MD for:  persistent dizziness, light-headedness, or visual disturbances     Call MD for:  increased confusion or weakness     No dressing needed       Follow-up  Information     Follow up with Tierney Ramos MD In 2 weeks.    Specialty:  Urology    Why:  post op cystoscopy     Contact information:    [address]  [facility] [id number] [phone]          Agree with the above.

## 2017-04-13 NOTE — TRANSFER OF CARE
"Anesthesia Transfer of Care Note    Patient: Rhiannon Martínez    Procedure(s) Performed: Procedure(s):  CYSTOSCOPY    Patient location: PACU    Anesthesia Type: MAC    Transport from OR: Transported from OR on room air with adequate spontaneous ventilation    Post pain: adequate analgesia    Post assessment: no apparent anesthetic complications and tolerated procedure well    Post vital signs: stable    Level of consciousness: awake, alert and oriented    Nausea/Vomiting: no nausea/vomiting    Complications: none          Last vitals:   Visit Vitals    BP (!) 142/75 (BP Location: Left arm, Patient Position: Lying, BP Method: Automatic)    Pulse 97    Temp 36.4 °C (97.5 °F) (Oral)    Resp 18    Ht 5' 7" (1.702 m)    Wt 111.1 kg (245 lb)    SpO2 99%    Breastfeeding No    BMI 38.37 kg/m2     "

## 2017-04-13 NOTE — PROGRESS NOTES
Dr. Ramos notified pt unable to be catherized with 16 fr catheter.. Procedure attempted x 3 with 3 different catheter kits and was unsuccessful. Pt with labia and urethra appearing red with c/o severe soreness during attempts to catherization.

## 2017-04-13 NOTE — DISCHARGE INSTRUCTIONS
Elizabeth Catheter Care    A Elizabeth catheter is a rubber tube that is placed through the urethra (opening where urine comes out) and into the bladder. This helps drain urine from the bladder. There is a small balloon on the end of the tube that is inflated after insertion. This keeps the catheter from sliding out of the bladder.  A Elizabeth catheter is used to treat urinary retention (unable to pass urine). It is also used when there is incontinence (loss of bladder control).  Home care  · Finish taking any prescribed antibiotic even if you are feeling better before then.  · It is important to keep bacteria from getting into the collection bag. Do not disconnect the catheter from the collection bag.  · Use a leg band to secure the drainage tube, so it does not pull on the catheter. Drain the collection bag when it becomes full using the drain spout at the bottom of the bag.  · Do not try to pull or remove your catheter. This will injure your urethra. It must be removed by your healthcare provider or nurse.  Follow-up care  Follow up with your healthcare provider as advised for repeat urine testing and catheter removal or replacement.  When to seek medical advice  Call your healthcare provider right away if any of these occur:  · Fever of 100.4ºF (38ºC) or higher, or as directed by your healthcare provider  · Bladder pain or fullness  · Abdominal swelling, nausea or vomiting, or back pain  · Blood or urine leakage around the catheter  · Bloody urine coming from the catheter (if a new symptom)  · Catheter falls out  · Catheter stops draining for 6 hours  · Weakness, dizziness, or fainting  Date Last Reviewed: 10/1/2016  © 1222-9022 [employer]. [address] [place] [address], [place] [address]. All rights reserved. This information is not intended as a substitute for professional medical care. Always follow your healthcare professional's instructions.        Cystoscopy    Cystoscopy is a procedure that lets your  doctor look directly inside your urethra and bladder. It can be used to:  · Help diagnose a problem with your urethra, bladder, or kidneys.  · Take a sample (biopsy) of bladder or urethral tissue.  · Treat certain problems (such as removing kidney stones).  · Place a stent to bypass an obstruction.  · Take special X-rays of the kidneys.  Based on the findings, your doctor may recommend other tests or treatments.  What is a cystoscope?  A cystoscope is a telescope-like instrument that contains lenses and fiberoptics (small glass wires that make bright light). The cystoscope may be straight and rigid, or flexible to bend around curves in the urethra. The doctor may look directly into the cystoscope, or project the image onto a monitor.  Getting ready  · Ask your doctor if you should stop taking any medications prior to the procedure.  · Ask whether you should avoid eating or drinking anything after midnight before the procedure.  · Follow any other instructions your doctor gives you.  Tell your doctor before the exam if you:  · Take any medications, such as aspirin or blood thinners  · Have allergies to any medications  · Are pregnant   The procedure  Cystoscopy is done in the doctors office or hospital. The doctor and a nurse are present during the procedure. It takes only a few minutes, longer if a biopsy, X-ray, or treatment needs to be done.  During the procedure:  · You lie on an exam table on your back, knees bent and legs apart. You are covered with a drape.  · Your urethra and the area around it are washed. Anesthetic jelly may be applied to numb the urethra. Other pain medication is usually not needed. In some cases, you may be offered a mild sedative to help you relax. If a more extensive procedure is to be done, such as a biopsy or kidney stone removal, general anesthesia may be needed.  · The cystoscope is inserted. A sterile fluid is put into the bladder to expand it. You may feel pressure from this  fluid.  · When the procedure is done, the cystoscope is removed.  After the procedure  If you had a sedative, general anesthesia, or spinal anesthesia, you must have someone drive you home. Once youre home:  · Drink plenty of fluids.  · You may have burning or light bleeding when you urinate--this is normal.  · Medications may be prescribed to ease any discomfort or prevent infection. Take these as directed.  · Call your doctor if you have heavy bleeding or blood clots, burning that lasts more than a day, a fever over 100°F  (38° C), or trouble urinating.  Date Last Reviewed: 9/8/2014  © 8837-9268 [employer]. [address] [place] [address], [place] [address]. All rights reserved. This information is not intended as a substitute for professional medical care. Always follow your healthcare professional's instructions.

## 2017-04-13 NOTE — ANESTHESIA RELEASE NOTE
Anesthesia Release from PACU Note    Patient name: Rhiannon Martínez    Procedure(s): Procedure(s):  CYSTOSCOPY    Anesthesia type: MAC    Post pain: adequate analgesia    Post assessment: no apparent complications    Last vitals:   Vitals:    04/13/17 1430   BP: 137/84   Pulse: 88   Resp: 16   Temp:        Post vital signs: stable    Level of consciousness: alert     Nausea/Vomiting: no nausea/no vomiting    Complications: none    Airway Patency:  patent    Respiratory: unassisted    Cardiovascular: stable and blood pressure at baseline    Hydration: euvolemic

## 2017-04-13 NOTE — IP AVS SNAPSHOT
[facility]  [address]  [facility] [address]  Phone: [phone]           Patient Discharge Instructions     Our goal is to set you up for success. This packet includes information on your condition, medications, and your home care. It will help you to care for yourself so you don't get sicker and need to go back to the hospital.     Please ask your nurse if you have any questions.        There are many details to remember when preparing to leave the hospital. Here is what you will need to do:    1. Take your medicine. If you are prescribed medications, review your Medication List in the following pages. You may have new medications to  at the pharmacy and others that you'll need to stop taking. Review the instructions for how and when to take your medications. Talk with your doctor or nurses if you are unsure of what to do.     2. Go to your follow-up appointments. Specific follow-up information is listed in the following pages. Your may be contacted by a transition nurse or clinical provider about future appointments. Be sure we have all of the phone numbers to reach you, if needed. Please contact your provider's office if you are unable to make an appointment.     3. Watch for warning signs. Your doctor or nurse will give you detailed warning signs to watch for and when to call for assistance. These instructions may also include educational information about your condition. If you experience any of warning signs to your health, call your doctor.           [facility]  Unless otherwise directed by your provider, please   contact [facility], our nurse care line   that is available for 24/7 assistance.     [phone] (toll-free)     Registered nurses in the [facility]   provide: appointment scheduling, clinical advisement, health education, and other advisory services.                  ** Verify the list of medication(s) below is accurate and  up to date. Carry this with you in case of emergency. If your medications have changed, please notify your healthcare provider.             Medication List      START taking these medications        Additional Info                      amoxicillin-clavulanate 500-125mg 500-125 mg Tab   Commonly known as:  AUGMENTIN   Quantity:  6 tablet   Refills:  0   Dose:  1 tablet    Instructions:  Take 1 tablet (500 mg total) by mouth 2 (two) times daily.     Begin Date    AM    Noon    PM    Bedtime         CONTINUE taking these medications        Additional Info                      alendronate 70 MG tablet   Commonly known as:  FOSAMAX   Quantity:  12 tablet   Refills:  0   Dose:  70 mg    Instructions:  Take 1 tablet (70 mg total) by mouth every 7 days.     Begin Date    AM    Noon    PM    Bedtime       fenofibrate 145 MG tablet   Commonly known as:  TRICOR   Quantity:  90 tablet   Refills:  0   Dose:  145 mg    Instructions:  Take 1 tablet (145 mg total) by mouth once daily.     Begin Date    AM    Noon    PM    Bedtime       * liraglutide 0.6 mg/0.1 mL (18 mg/3 mL) subq PNIJ 0.6 mg/0.1 mL (18 mg/3 mL) Pnij   Commonly known as:  VICTOZA 3-CHANNING   Quantity:  3 mL   Refills:  2   Dose:  1.2 mg    Instructions:  Inject 1.2 mg into the skin once daily.     Begin Date    AM    Noon    PM    Bedtime       * liraglutide 0.6 mg/0.1 mL (18 mg/3 mL) subq PNIJ 0.6 mg/0.1 mL (18 mg/3 mL) Pnij   Commonly known as:  VICTOZA 2-CHANNING   Quantity:  3 mL   Refills:  2   Dose:  0.6 mg    Instructions:  Inject 0.6 mg into the skin once daily.     Begin Date    AM    Noon    PM    Bedtime       metoprolol succinate 50 MG 24 hr tablet   Commonly known as:  TOPROL-XL   Quantity:  90 tablet   Refills:  0   Dose:  50 mg    Instructions:  Take 1 tablet (50 mg total) by mouth once daily.     Begin Date    AM    Noon    PM    Bedtime       * ostomy supplies Misc   Refills:  0   Dose:  1 each    Instructions:  1 each by Misc.(Non-Drug; Combo Route) route  2 (two) times daily. Pt uses natura drainable pouch with filter  2 3/4 inches 70 mm     Begin Date    AM    Noon    PM    Bedtime       * ostomy supplies Misc   Refills:  0   Dose:  2 each    Instructions:  2 each by Misc.(Non-Drug; Combo Route) route 2 (two) times daily. Pt uses modable stomahesive skin barriers, 1 1/4 x 1 3/4 inches to 1 3/4 x 2 1/8 inches (2 3/4 inches 70 mm)     Begin Date    AM    Noon    PM    Bedtime       rivaroxaban 15 mg Tab   Commonly known as:  XARELTO   Quantity:  90 tablet   Refills:  0   Dose:  15 mg    Instructions:  Take 1 tablet (15 mg total) by mouth once daily.     Begin Date    AM    Noon    PM    Bedtime       * Notice:  This list has 4 medication(s) that are the same as other medications prescribed for you. Read the directions carefully, and ask your doctor or other care provider to review them with you.         Where to Get Your Medications      You can get these medications from any pharmacy     Bring a paper prescription for each of these medications     amoxicillin-clavulanate 500-125mg 500-125 mg Tab                  Please bring to all follow up appointments:    1. A copy of your discharge instructions.  2. All medicines you are currently taking in their original bottles.  3. Identification and insurance card.    Please arrive 15 minutes ahead of scheduled appointment time.    Please call 24 hours in advance if you must reschedule your appointment and/or time.        Your Scheduled Appointments     Apr 26, 2017  1:40 PM CDT   Post OP with MD Taj Thomas [facility] - Urology 4th Floor ([facility] )    [address]  [facility] [address] [phone]              Follow-up Information     Follow up with Tierney Ramos MD In 2 weeks.    Specialty:  Urology    Why:  post op cystoscopy     Contact information:    [address] [address] [phone]          Discharge Instructions     Future Orders    Activity as tolerated      Call MD for:  difficulty breathing or increased cough     Call MD for:  increased confusion or weakness     Call MD for:  persistent dizziness, light-headedness, or visual disturbances     Call MD for:  persistent nausea and vomiting or diarrhea     Call MD for:  redness, tenderness, or signs of infection (pain, swelling, redness, odor or green/yellow discharge around incision site)     Call MD for:  severe persistent headache     Call MD for:  severe uncontrolled pain     Call MD for:  temperature >100.4     Call MD for:  worsening rash     Diet general     Questions:    Total calories:      Fat restriction, if any:      Protein restriction, if any:      Na restriction, if any:      Fluid restriction:      Additional restrictions:      No dressing needed         Discharge Instructions         Cystoscopy    Cystoscopy is a procedure that lets your doctor look directly inside your urethra and bladder. It can be used to:  · Help diagnose a problem with your urethra, bladder, or kidneys.  · Take a sample (biopsy) of bladder or urethral tissue.  · Treat certain problems (such as removing kidney stones).  · Place a stent to bypass an obstruction.  · Take special X-rays of the kidneys.  Based on the findings, your doctor may recommend other tests or treatments.  What is a cystoscope?  A cystoscope is a telescope-like instrument that contains lenses and fiberoptics (small glass wires that make bright light). The cystoscope may be straight and rigid, or flexible to bend around curves in the urethra. The doctor may look directly into the cystoscope, or project the image onto a monitor.  Getting ready  · Ask your doctor if you should stop taking any medications prior to the procedure.  · Ask whether you should avoid eating or drinking anything after midnight before the procedure.  · Follow any other instructions your doctor gives you.  Tell your doctor before the exam if you:  · Take any medications, such as aspirin or blood  thinners  · Have allergies to any medications  · Are pregnant   The procedure  Cystoscopy is done in the doctors office or hospital. The doctor and a nurse are present during the procedure. It takes only a few minutes, longer if a biopsy, X-ray, or treatment needs to be done.  During the procedure:  · You lie on an exam table on your back, knees bent and legs apart. You are covered with a drape.  · Your urethra and the area around it are washed. Anesthetic jelly may be applied to numb the urethra. Other pain medication is usually not needed. In some cases, you may be offered a mild sedative to help you relax. If a more extensive procedure is to be done, such as a biopsy or kidney stone removal, general anesthesia may be needed.  · The cystoscope is inserted. A sterile fluid is put into the bladder to expand it. You may feel pressure from this fluid.  · When the procedure is done, the cystoscope is removed.  After the procedure  If you had a sedative, general anesthesia, or spinal anesthesia, you must have someone drive you home. Once youre home:  · Drink plenty of fluids.  · You may have burning or light bleeding when you urinate--this is normal.  · Medications may be prescribed to ease any discomfort or prevent infection. Take these as directed.  · Call your doctor if you have heavy bleeding or blood clots, burning that lasts more than a day, a fever over 100°F  (38° C), or trouble urinating.  Date Last Reviewed: 9/8/2014  © 1107-5769 [employer]. [address] [place] [address], [place] [address]. All rights reserved. This information is not intended as a substitute for professional medical care. Always follow your healthcare professional's instructions.            Primary Diagnosis     Your primary diagnosis was:  Recurrent Uti      Admission Information     Date & Time Provider Department CSN    4/13/2017 10:31 AM Tierney Ramos MD [facility] [id number]       Admisson Diagnosis:  "Recurrent UTI, Recurrent UTI      Care Providers     Provider Role Specialty Primary office phone    Tierney Ramos MD Attending Provider Urology [phone]    Tierney Ramos MD Surgeon  Urology [phone]      Your Vitals Were     BP Pulse Temp Resp Height Weight    119/81 96 97.9 °F (36.6 °C) (Oral) 16 5' 7" (1.702 m) 111.1 kg (245 lb)    SpO2 BMI             100% 38.37 kg/m2         Recent Lab Values        3/7/2006 5/9/2006 1/30/2007 8/15/2008 1/16/2009 8/31/2009 1/8/2010         3:50 PM  2:09 PM 11:05 AM 10:32 AM 11:25 AM 11:44 AM  6:45 AM     A1C 7.3 (H) 6.5 (H) 7.2 (H) 6.5 (H) 6.8 (H) 6.8 (H) 6.8 (H)           Allergies as of 4/13/2017     No Known Allergies      Advance Directives     An advance directive is a document which, in the event you are no longer able to make decisions for yourself, tells your healthcare team what kind of treatment you do or do not want to receive, or who you would like to make those decisions for you.  If you do not currently have an advance directive, [facility] encourages you to create one.  For more information call:  [phone]), [phone] ([phone]), or log on to [email or web address].        Translation Services Information     ATTENTION: Language assistance services are available, free of charge. Please call [phone].    ATENCIÓN: Si habla español, tiene a jones disposición servicios gratuitos de asistencia lingüística. Llame al [phone].     CHÚ Ý: N?u b?n nói Ti?ng Vi?t, có các d?ch v? h? tr? ngôn ng? mi?n phí dành cho b?n. G?i s? [phone].        Diabetes Discharge Instructions                                   [employer] Information            [facility] complies with applicable Federal civil rights laws and does not discriminate on the basis of race, color, national origin, age, disability, or sex.        "

## 2017-04-13 NOTE — OP NOTE
[facility]  Operative Note    Date: 04/13/2017    Pre-Op Diagnosis: recurrent UTI  Patient Active Problem List    Diagnosis Date Noted    Urge incontinence 03/29/2017    Recurrent UTI 03/29/2017    Essential hypertension 11/17/2016    Non-insulin dependent type 2 diabetes mellitus 11/17/2016    BMI 35.0-35.9,adult 05/06/2016    Diabetes mellitus, type 2     Hyperlipidemia     Cancer     Atrial fibrillation     HTN (hypertension) 12/18/2014    Hypercholesteremia 12/18/2014    DM (diabetes mellitus) 12/18/2014    A-fib 12/18/2014    H/O colostomy 12/18/2014       Post-Op Diagnosis: same    Procedure(s) Performed:   1.  Cystoscopy     Specimen(s): none    Staff Surgeon: Tierney Ramos MD    Assistant Surgeon: Steven Hinkle MD    Anesthesia: Monitored Local Anesthesia with Sedation    Indications: Rhiannon Martínez is a 76 y.o. female with hx of colon cancer s/p resection, no XRT, with urge incontinence and recurrent UTI     Findings:     - large, patulous bladder (approx 550 ml cc capacity.  Did not undergo hydrodistension)  - numerous large diverticula containing pus  - no bladder lesions or masses    Estimated Blood Loss: min    Drains: none    Procedure in Detail:  After risks, benefits and possible complications of cystoscopy were explained, the patient elected to undergo the procedure and informed consent was obtained. All questions were answered in the steven-operative area. The patient was transferred to the cystoscopy suite and placed in the supine position.  SCDs were applied and working.  Anesthesia was administered.  Once the patient was adequately sedated, she was placed in the dorsal lithotomy position and prepped and draped in the usual sterile fashion.  Time out was performed, steven-procedural antibiotics were confirmed.     A rigid cystoscope in a 22 Fr sheath was introduced into the bladder per urethra. This passed easily.  The entire urethra was visualized which  showed no masses or strictures.  The right and left ureteral orifices were identified in the normal anatomic position and were seen effluxing clear urine.  Formal cystoscopy was performed which revealed no masses or lesions suspicious for malignancy, and no bladder stones.  There were numerous large bladder diverticuli, moderate trabeculation, and the bladder was very patulous.  The pus was copiously irrigated out of the diverticula and other areas where it had settled.  The bladder was drained, 2% lidocaine jelly was injected into the bladder and the patient was removed from lithotomy.     The patient tolerated the procedure well and was transferred to recovery in stable condition.    Disposition:  The patient will follow up with Dr. Ramos in 2 weeks.  The patient was given prescriptions for augmentin.      Steven Hinkle MD    I was present for the entire case and agree with the above note.

## 2017-04-13 NOTE — PROGRESS NOTES
Rachel to bs with aseptic catheter insertion. Pt tolerate well with minimal c/o voiced. Rachel instructed pt to rtc on Monday @ 8 am for catheter removal and assessment.

## 2017-04-17 ENCOUNTER — CLINICAL SUPPORT (OUTPATIENT)
Dept: UROLOGY | Facility: CLINIC | Age: 77
End: 2017-04-17
Payer: MEDICARE

## 2017-04-17 VITALS
DIASTOLIC BLOOD PRESSURE: 76 MMHG | HEART RATE: 97 BPM | BODY MASS INDEX: 38.51 KG/M2 | WEIGHT: 245.38 LBS | HEIGHT: 67 IN | SYSTOLIC BLOOD PRESSURE: 134 MMHG

## 2017-04-17 DIAGNOSIS — Z98.890 POST-OPERATIVE STATE: Primary | ICD-10-CM

## 2017-04-17 PROCEDURE — 99499 UNLISTED E&M SERVICE: CPT | Mod: S$GLB,,, | Performed by: UROLOGY

## 2017-04-17 PROCEDURE — 99999 PR PBB SHADOW E&M-EST. PATIENT-LVL III: CPT | Mod: PBBFAC,,, | Performed by: UROLOGY

## 2017-04-17 NOTE — PROGRESS NOTES
Patient had a catheter placed in the recovery room on the 13th.  She had 625 ml at the time the catheter was placed.  She returns today for catheter removal.  This was done without event.

## 2017-04-26 ENCOUNTER — OFFICE VISIT (OUTPATIENT)
Dept: UROLOGY | Facility: CLINIC | Age: 77
End: 2017-04-26
Payer: MEDICARE

## 2017-04-26 ENCOUNTER — TELEPHONE (OUTPATIENT)
Dept: UROLOGY | Facility: CLINIC | Age: 77
End: 2017-04-26

## 2017-04-26 VITALS
HEART RATE: 100 BPM | DIASTOLIC BLOOD PRESSURE: 68 MMHG | SYSTOLIC BLOOD PRESSURE: 119 MMHG | WEIGHT: 245.13 LBS | HEIGHT: 67 IN | BODY MASS INDEX: 38.47 KG/M2

## 2017-04-26 DIAGNOSIS — R33.9 INCOMPLETE EMPTYING OF BLADDER: ICD-10-CM

## 2017-04-26 DIAGNOSIS — N39.41 URGE INCONTINENCE: ICD-10-CM

## 2017-04-26 DIAGNOSIS — N39.0 RECURRENT UTI: Primary | ICD-10-CM

## 2017-04-26 DIAGNOSIS — N39.41 URGE INCONTINENCE: Primary | ICD-10-CM

## 2017-04-26 PROCEDURE — 99024 POSTOP FOLLOW-UP VISIT: CPT | Mod: S$GLB,,, | Performed by: UROLOGY

## 2017-04-26 PROCEDURE — 99999 PR PBB SHADOW E&M-EST. PATIENT-LVL III: CPT | Mod: PBBFAC,,, | Performed by: UROLOGY

## 2017-04-26 NOTE — PROGRESS NOTES
CHIEF COMPLAINT:    Mrs. Martínez is a 76 y.o. female presenting for a follow up after cystoscopy under anesthesia.      PRESENTING ILLNESS:    Rhiannon Martínez is a 76 y.o. female who returns today.  On cystoscopy, she was found to have a large bladder with a significant amount of debris.  Post op, she was unable to urinate and when the catheter was placed she had 625 ml that drained.  This was left in place for 4 days and was removed in the clinic.  She states she is now able to urinate but she also has urgency and urge incontinence.  At night she cannot get to the commode in time.  She has a history of Diabetes mellitus and has numbness of her feet.       Allergies:  Review of patient's allergies indicates no known allergies.    Medications:  Outpatient Encounter Prescriptions as of 4/26/2017   Medication Sig Dispense Refill    alendronate (FOSAMAX) 70 MG tablet Take 1 tablet (70 mg total) by mouth every 7 days. 12 tablet 0    fenofibrate (TRICOR) 145 MG tablet Take 1 tablet (145 mg total) by mouth once daily. 90 tablet 0    liraglutide 0.6 mg/0.1 mL, 18 mg/3 mL, subq PNIJ (VICTOZA 2-CHANNING) 0.6 mg/0.1 mL (18 mg/3 mL) PnIj Inject 0.6 mg into the skin once daily. 3 mL 2    liraglutide 0.6 mg/0.1 mL, 18 mg/3 mL, subq PNIJ (VICTOZA 3-CHANNING) 0.6 mg/0.1 mL (18 mg/3 mL) PnIj Inject 1.2 mg into the skin once daily. 3 mL 2    metoprolol succinate (TOPROL-XL) 50 MG 24 hr tablet Take 1 tablet (50 mg total) by mouth once daily. 90 tablet 0    ostomy supplies Misc 1 each by Misc.(Non-Drug; Combo Route) route 2 (two) times daily. Pt uses natura drainable pouch with filter  2 3/4 inches 70 mm      ostomy supplies Misc 2 each by Misc.(Non-Drug; Combo Route) route 2 (two) times daily. Pt uses modable stomahesive skin barriers, 1 1/4 x 1 3/4 inches to 1 3/4 x 2 1/8 inches (2 3/4 inches 70 mm)      rivaroxaban (XARELTO) 15 mg Tab Take 1 tablet (15 mg total) by mouth once daily. 90 tablet 0     No facility-administered encounter  medications on file as of 4/26/2017.          PHYSICAL EXAMINATION:    The patient generally appears in good health, is appropriately interactive, and is in no apparent distress.  Presents in a motorized wheel chair    Skin: No lesions.    Mental: Cooperative with normal affect.    Neuro: Grossly intact.    HEENT: Normal. No evidence of lymphadenopathy.    Chest: normal inspiratory effort.    Abdomen: Soft, non-tender. No masses or organomegaly. Bladder is not palpable. No evidence of flank discomfort. No evidence of inguinal hernia.    Extremities: No clubbing, cyanosis, or edema    IMPRESSION:    Likely has overflow incontinence    PLAN:    1.  For FUDS

## 2017-05-12 ENCOUNTER — TELEPHONE (OUTPATIENT)
Dept: UROLOGY | Facility: CLINIC | Age: 77
End: 2017-05-12

## 2017-05-12 DIAGNOSIS — N30.90 BLADDER INFECTION: Primary | ICD-10-CM

## 2017-05-12 DIAGNOSIS — R33.9 INCOMPLETE EMPTYING OF BLADDER: ICD-10-CM

## 2017-05-12 RX ORDER — AMOXICILLIN AND CLAVULANATE POTASSIUM 500; 125 MG/1; MG/1
1 TABLET, FILM COATED ORAL 3 TIMES DAILY
Qty: 21 TABLET | Refills: 0 | Status: SHIPPED | OUTPATIENT
Start: 2017-05-12 | End: 2017-05-19

## 2017-05-12 NOTE — TELEPHONE ENCOUNTER
----- Message from Karen Salmon LPN sent at 5/12/2017  2:36 PM CDT -----  Contact: self  [phone]      ----- Message -----     From: Ambar Rader MA     Sent: 5/12/2017   2:27 PM       To: Rachel Monet Staff    States she is having a procedure on 5-30-17 (FUDS) and is with an infection and needs an antibiotic or needs to know what to take.

## 2017-05-22 ENCOUNTER — OFFICE VISIT (OUTPATIENT)
Dept: UROLOGY | Facility: CLINIC | Age: 77
End: 2017-05-22
Payer: MEDICARE

## 2017-05-22 DIAGNOSIS — R33.9 INCOMPLETE EMPTYING OF BLADDER: ICD-10-CM

## 2017-05-22 DIAGNOSIS — N39.0 URINARY TRACT INFECTION WITHOUT HEMATURIA, SITE UNSPECIFIED: Primary | ICD-10-CM

## 2017-05-22 PROCEDURE — 81002 URINALYSIS NONAUTO W/O SCOPE: CPT | Mod: S$GLB,,, | Performed by: UROLOGY

## 2017-05-22 PROCEDURE — 99213 OFFICE O/P EST LOW 20 MIN: CPT | Mod: 25,S$GLB,, | Performed by: UROLOGY

## 2017-05-22 PROCEDURE — 87086 URINE CULTURE/COLONY COUNT: CPT

## 2017-05-22 PROCEDURE — 99999 PR PBB SHADOW E&M-EST. PATIENT-LVL II: CPT | Mod: PBBFAC,,, | Performed by: UROLOGY

## 2017-05-22 PROCEDURE — 1159F MED LIST DOCD IN RCRD: CPT | Mod: S$GLB,,, | Performed by: UROLOGY

## 2017-05-22 NOTE — PROGRESS NOTES
CHIEF COMPLAINT:    Mrs. Martínez is a 77 y.o. female who presents to have a catheterized specimen in anticipation for FUDS she has scheduled for incomplete bladder emptying.        PRESENTING ILLNESS:      Rhiannon Martínez is a 77 y.o. female who has a history of incomplete bladder emptying who is scheduled for FUDS next week.  She presents for a catheteized specimen.  She states that her external genitalia are tender and she has been placing Demi's Butt Paste.      REVIEW OF SYSTEMS:    Review of Systems   Constitutional: Negative.    HENT: Negative.    Eyes: Negative.    Respiratory: Negative.    Cardiovascular: Negative.    Gastrointestinal: Negative.    Genitourinary: Positive for frequency and urgency.   Musculoskeletal: Positive for back pain and myalgias.   Skin: Negative.    Neurological: Negative.    Endo/Heme/Allergies: Negative.    Psychiatric/Behavioral: Negative.      PATIENT HISTORY:    Past Medical History:   Diagnosis Date    Atrial fibrillation     Cancer     colon & rectum     Diabetes mellitus, type 2     Hernia     left abd     Hyperlipidemia     Hypertension     Thyroid disease 1986    was on thyroid medication for 20 years and        Past Surgical History:   Procedure Laterality Date    CHOLECYSTECTOMY      1999    COLON SURGERY      twisted colon dr [place]    HYSTERECTOMY      1969    TONSILLECTOMY         Family History   Problem Relation Age of Onset    Cancer Father      Social History    Marital status:      Occupational History    retired  resturent owner       Social History Main Topics    Smoking status: Never Smoker    Smokeless tobacco: Not on file    Alcohol use 1.8 oz/week     1 Glasses of wine, 1 Cans of beer, 1 Shots of liquor per week      Comment: 2 beer per month    Drug use: No    Sexual activity: No       Allergies:  Review of patient's allergies indicates no known allergies.    Medications:  Outpatient Encounter Prescriptions as of 5/22/2017    Medication Sig Dispense Refill    alendronate (FOSAMAX) 70 MG tablet Take 1 tablet (70 mg total) by mouth every 7 days. 12 tablet 0    fenofibrate (TRICOR) 145 MG tablet Take 1 tablet (145 mg total) by mouth once daily. 90 tablet 0    liraglutide 0.6 mg/0.1 mL, 18 mg/3 mL, subq PNIJ (VICTOZA 2-CHANNING) 0.6 mg/0.1 mL (18 mg/3 mL) PnIj Inject 0.6 mg into the skin once daily. 3 mL 2    liraglutide 0.6 mg/0.1 mL, 18 mg/3 mL, subq PNIJ (VICTOZA 3-CHANNING) 0.6 mg/0.1 mL (18 mg/3 mL) PnIj Inject 1.2 mg into the skin once daily. 3 mL 2    metoprolol succinate (TOPROL-XL) 50 MG 24 hr tablet Take 1 tablet (50 mg total) by mouth once daily. 90 tablet 0    metronidazole (METROGEL) 0.75 % vaginal gel Place 1 applicator vaginally 2 (two) times daily. 70 g 0    ostomy supplies Misc 1 each by Misc.(Non-Drug; Combo Route) route 2 (two) times daily. Pt uses natura drainable pouch with filter  2 3/4 inches 70 mm      ostomy supplies Misc 2 each by Misc.(Non-Drug; Combo Route) route 2 (two) times daily. Pt uses modable stomahesive skin barriers, 1 1/4 x 1 3/4 inches to 1 3/4 x 2 1/8 inches (2 3/4 inches 70 mm)      rivaroxaban (XARELTO) 15 mg Tab Take 1 tablet (15 mg total) by mouth once daily. 90 tablet 0     No facility-administered encounter medications on file as of 5/22/2017.          PHYSICAL EXAMINATION:    The patient generally appears in good health, is appropriately interactive, and is in no apparent distress.    Skin: No lesions.    Mental: Cooperative with normal affect.    Neuro: Grossly intact.    HEENT: Normal. No evidence of lymphadenopathy.    Chest:  normal inspiratory effort.    Extremities: No clubbing, cyanosis, or edema    Urethra is very anterior and high in the vagina, owing to the patient's body habitus. Has erythema and the apical vagina skin is thin.      LABS:    UA 1.010, pH 5, otherwise, negative    IMPRESSION:    Encounter Diagnoses   Name Primary?    Urinary tract infection without hematuria, site  unspecified Yes       PLAN:    1. The catheterized specimen was sent for culture  2.  metrogel given for the vaginitis

## 2017-05-24 LAB — BACTERIA UR CULT: NORMAL

## 2017-05-29 ENCOUNTER — TELEPHONE (OUTPATIENT)
Dept: UROLOGY | Facility: CLINIC | Age: 77
End: 2017-05-29

## 2017-05-30 ENCOUNTER — HOSPITAL ENCOUNTER (OUTPATIENT)
Facility: HOSPITAL | Age: 77
Discharge: HOME | End: 2017-05-30
Attending: UROLOGY | Admitting: UROLOGY
Payer: MEDICARE

## 2017-05-30 ENCOUNTER — SURGERY (OUTPATIENT)
Age: 77
End: 2017-05-30

## 2017-05-30 ENCOUNTER — TELEPHONE (OUTPATIENT)
Dept: UROLOGY | Facility: CLINIC | Age: 77
End: 2017-05-30

## 2017-05-30 VITALS
BODY MASS INDEX: 37.03 KG/M2 | SYSTOLIC BLOOD PRESSURE: 103 MMHG | TEMPERATURE: 98 F | WEIGHT: 250 LBS | HEART RATE: 104 BPM | HEIGHT: 69 IN | DIASTOLIC BLOOD PRESSURE: 87 MMHG | OXYGEN SATURATION: 100 % | RESPIRATION RATE: 20 BRPM

## 2017-05-30 DIAGNOSIS — N39.0 RECURRENT UTI: ICD-10-CM

## 2017-05-30 DIAGNOSIS — N39.41 URGE INCONTINENCE: ICD-10-CM

## 2017-05-30 DIAGNOSIS — R33.9 URINARY RETENTION WITH INCOMPLETE BLADDER EMPTYING: Primary | ICD-10-CM

## 2017-05-30 PROCEDURE — 76000 FLUOROSCOPY <1 HR PHYS/QHP: CPT | Mod: 26,59,, | Performed by: UROLOGY

## 2017-05-30 PROCEDURE — 36000704 HC OR TIME LEV I 1ST 15 MIN: Performed by: UROLOGY

## 2017-05-30 PROCEDURE — 36000705 HC OR TIME LEV I EA ADD 15 MIN: Performed by: UROLOGY

## 2017-05-30 PROCEDURE — 51784 ANAL/URINARY MUSCLE STUDY: CPT | Mod: 26,51,, | Performed by: UROLOGY

## 2017-05-30 PROCEDURE — 51741 ELECTRO-UROFLOWMETRY FIRST: CPT | Mod: 26,51,, | Performed by: UROLOGY

## 2017-05-30 PROCEDURE — 51797 INTRAABDOMINAL PRESSURE TEST: CPT | Mod: 26,,, | Performed by: UROLOGY

## 2017-05-30 PROCEDURE — 51728 CYSTOMETROGRAM W/VP: CPT | Mod: 26,,, | Performed by: UROLOGY

## 2017-05-30 PROCEDURE — 27200973 HC CYSTO SUPPLY IV (URODYNAMICS): Performed by: UROLOGY

## 2017-05-30 NOTE — OP NOTE
Fluoro Urodynamic Report    Indication:  Urinary retention incomplete bladder emptying    Patient was taken to the Urodynamic Suite with a comfortably full bladder and asked to perform a free uroflow.  Next, the patient was prepped and the urinary residual was drained with a 14 Fr catheter.  A 7 Fr dual lumen catheter was placed to measure intravesical pressures.  A 10 Fr balloon manometer was placed into the rectum for abdominal pressure measurements.  Patch EMG electrodes were placed on the perineum.  The patient was connected to the [employer] Urodynamic machine, using a multichannel technique.  The bladder was filled with Cysto ConRay at room temperature at a rate of 30 ml/min.  Patient is filled to urgency.  Filling is performed with the patient in the seated position.  Abdominal leak point pressures are checked at 1st desire, then serially at 50cc increments first with Valsalva then with coughing.  The patient was then asked to sit and void for a pressure flow study.    The following are the results of the study:  1.  Uroflow       Q max:  1.5 ml/sec       Voided volume:  8.6 ml       Pattern of the curve:  intermittent    2.  PVR:  775 ml    3.  CMG       Sensation:         First Desire:  78.7 ml         Normal Desire:  224.3 ml         Strong Desire:  266.7 ml         Urgency:  338.1 ml       Capacity:  600 ml       Abnormal Contrations:  none       Compliance:  normal    4.  Abdominal Leak Point Pressure:  No stress incontinence    5.  EMG:  Normal, unchanged throughout    6.  Voiding phase       Q max:         P det max:  9.1 cm H2O       Pattern of the curve:  Could not void       Voided volume:  0       PVR:  600 ml    7.  Fluoroscopy:  Bladder smooth, there were no space occupying lesions.  The bladder neck tried to open but could not fully do so.      8.  Analysis:  Decreased sensation, increased capacity, urinary retention    9.  Recommendations:       A.  Options are the following:  Do nothing, she  cannot catheterize but CIC would be an option in another patient (her urethral meatus is very high, and the angle is high)  Suprapubic tube but that requires a tube through the lower abdomen, with changes every month.  InterStim was also discussed.         B.  Her  has renal failure and is on dialysis.  The above options may not be what she can take on at this time.  They live with her daughter.       C.  She will think about the above and let me know.  If not, the next follow up would be 6 months.

## 2017-05-30 NOTE — H&P (VIEW-ONLY)
CHIEF COMPLAINT:    Mrs. Martínez is a 77 y.o. female who presents to have a catheterized specimen in anticipation for FUDS she has scheduled for incomplete bladder emptying.        PRESENTING ILLNESS:      Rhiannon Martínez is a 77 y.o. female who has a history of incomplete bladder emptying who is scheduled for FUDS next week.  She presents for a catheteized specimen.  She states that her external genitalia are tender and she has been placing Demi's Butt Paste.      REVIEW OF SYSTEMS:    Review of Systems   Constitutional: Negative.    HENT: Negative.    Eyes: Negative.    Respiratory: Negative.    Cardiovascular: Negative.    Gastrointestinal: Negative.    Genitourinary: Positive for frequency and urgency.   Musculoskeletal: Positive for back pain and myalgias.   Skin: Negative.    Neurological: Negative.    Endo/Heme/Allergies: Negative.    Psychiatric/Behavioral: Negative.      PATIENT HISTORY:    Past Medical History:   Diagnosis Date    Atrial fibrillation     Cancer     colon & rectum     Diabetes mellitus, type 2     Hernia     left abd     Hyperlipidemia     Hypertension     Thyroid disease 1986    was on thyroid medication for 20 years and        Past Surgical History:   Procedure Laterality Date    CHOLECYSTECTOMY      1999    COLON SURGERY      [place]isted colon [place]    HYSTERECTOMY      1969    TONSILLECTOMY         Family History   Problem Relation Age of Onset    Cancer Father      Social History    Marital status:      Occupational History    retired  resturent owner       Social History Main Topics    Smoking status: Never Smoker    Smokeless tobacco: Not on file    Alcohol use 1.8 oz/week     1 Glasses of wine, 1 Cans of beer, 1 Shots of liquor per week      Comment: 2 beer per month    Drug use: No    Sexual activity: No       Allergies:  Review of patient's allergies indicates no known allergies.    Medications:  Outpatient Encounter Prescriptions as of 5/22/2017    Medication Sig Dispense Refill    alendronate (FOSAMAX) 70 MG tablet Take 1 tablet (70 mg total) by mouth every 7 days. 12 tablet 0    fenofibrate (TRICOR) 145 MG tablet Take 1 tablet (145 mg total) by mouth once daily. 90 tablet 0    liraglutide 0.6 mg/0.1 mL, 18 mg/3 mL, subq PNIJ (VICTOZA 2-CHANNING) 0.6 mg/0.1 mL (18 mg/3 mL) PnIj Inject 0.6 mg into the skin once daily. 3 mL 2    liraglutide 0.6 mg/0.1 mL, 18 mg/3 mL, subq PNIJ (VICTOZA 3-CHANNING) 0.6 mg/0.1 mL (18 mg/3 mL) PnIj Inject 1.2 mg into the skin once daily. 3 mL 2    metoprolol succinate (TOPROL-XL) 50 MG 24 hr tablet Take 1 tablet (50 mg total) by mouth once daily. 90 tablet 0    metronidazole (METROGEL) 0.75 % vaginal gel Place 1 applicator vaginally 2 (two) times daily. 70 g 0    ostomy supplies Misc 1 each by Misc.(Non-Drug; Combo Route) route 2 (two) times daily. Pt uses natura drainable pouch with filter  2 3/4 inches 70 mm      ostomy supplies Misc 2 each by Misc.(Non-Drug; Combo Route) route 2 (two) times daily. Pt uses modable stomahesive skin barriers, 1 1/4 x 1 3/4 inches to 1 3/4 x 2 1/8 inches (2 3/4 inches 70 mm)      rivaroxaban (XARELTO) 15 mg Tab Take 1 tablet (15 mg total) by mouth once daily. 90 tablet 0     No facility-administered encounter medications on file as of 5/22/2017.          PHYSICAL EXAMINATION:    The patient generally appears in good health, is appropriately interactive, and is in no apparent distress.    Skin: No lesions.    Mental: Cooperative with normal affect.    Neuro: Grossly intact.    HEENT: Normal. No evidence of lymphadenopathy.    Chest:  normal inspiratory effort.    Extremities: No clubbing, cyanosis, or edema    Urethra is very anterior and high in the vagina, owing to the patient's body habitus. Has erythema and the apical vagina skin is thin.      LABS:    UA 1.010, pH 5, otherwise, negative    IMPRESSION:    Encounter Diagnoses   Name Primary?    Urinary tract infection without hematuria, site  unspecified Yes       PLAN:    1. The catheterized specimen was sent for culture  2.  metrogel given for the vaginitis

## 2017-05-30 NOTE — INTERVAL H&P NOTE
The patient has been examined and the H&P has been reviewed:    I concur with the findings and no changes have occurred since H&P was written.    Anesthesia/Surgery risks, benefits and alternative options discussed and understood by patient/family.    Urine culture showed no growth.      Proceed with planned procedure.       Active Hospital Problems    Diagnosis  POA    Urinary retention with incomplete bladder emptying [R33.9]  Yes      Resolved Hospital Problems    Diagnosis Date Resolved POA   No resolved problems to display.

## 2017-10-09 PROBLEM — Z12.11 COLON CANCER SCREENING: Status: ACTIVE | Noted: 2017-10-09

## 2017-10-18 ENCOUNTER — TELEPHONE (OUTPATIENT)
Dept: UROLOGY | Facility: CLINIC | Age: 77
End: 2017-10-18

## 2017-10-18 NOTE — TELEPHONE ENCOUNTER
----- Message from Tierney Ramos MD sent at 10/18/2017 12:26 PM CDT -----  Contact: Pt:[phone]  Please let her know that I looked at her labs and the ones that I am qualified to comment are the renal function which is stable.      JT  ----- Message -----  From: Karen Salmon LPN  Sent: 10/16/2017   3:07 PM  To: Tierney Ramos MD    Pt would like for you to review her labs.  ----- Message -----  From: Meri Meza  Sent: 10/16/2017   2:00 PM  To: Rachel Monet Staff    Pt called and states she would like to know if  can look over her test results from  and let her know if she needs antibiotics or not.

## 2018-01-25 PROBLEM — I10 ESSENTIAL HYPERTENSION: Chronic | Status: ACTIVE | Noted: 2018-01-25

## 2018-10-20 ENCOUNTER — HOSPITAL ENCOUNTER (EMERGENCY)
Facility: HOSPITAL | Age: 78
Discharge: HOME | End: 2018-10-21
Attending: EMERGENCY MEDICINE
Payer: MEDICARE

## 2018-10-20 DIAGNOSIS — N18.9 CHRONIC KIDNEY DISEASE, UNSPECIFIED CKD STAGE: ICD-10-CM

## 2018-10-20 DIAGNOSIS — N30.01 ACUTE CYSTITIS WITH HEMATURIA: Primary | ICD-10-CM

## 2018-10-20 LAB
ALBUMIN SERPL BCP-MCNC: 3.3 G/DL
ALP SERPL-CCNC: 72 U/L
ALT SERPL W/O P-5'-P-CCNC: 9 U/L
ANION GAP SERPL CALC-SCNC: 8 MMOL/L
AST SERPL-CCNC: 10 U/L
BASOPHILS # BLD AUTO: 0.04 K/UL
BASOPHILS NFR BLD: 0.6 %
BILIRUB SERPL-MCNC: 0.3 MG/DL
BUN SERPL-MCNC: 36 MG/DL
CALCIUM SERPL-MCNC: 8.6 MG/DL
CHLORIDE SERPL-SCNC: 109 MMOL/L
CO2 SERPL-SCNC: 17 MMOL/L
CREAT SERPL-MCNC: 2.4 MG/DL
DIFFERENTIAL METHOD: NORMAL
EOSINOPHIL # BLD AUTO: 0.2 K/UL
EOSINOPHIL NFR BLD: 2.9 %
ERYTHROCYTE [DISTWIDTH] IN BLOOD BY AUTOMATED COUNT: 14 %
EST. GFR  (AFRICAN AMERICAN): 21.6 ML/MIN/1.73 M^2
EST. GFR  (NON AFRICAN AMERICAN): 18.8 ML/MIN/1.73 M^2
GLUCOSE SERPL-MCNC: 313 MG/DL
HCT VFR BLD AUTO: 41.4 %
HGB BLD-MCNC: 13.6 G/DL
IMM GRANULOCYTES # BLD AUTO: 0.02 K/UL
IMM GRANULOCYTES NFR BLD AUTO: 0.3 %
LYMPHOCYTES # BLD AUTO: 2.1 K/UL
LYMPHOCYTES NFR BLD: 33.8 %
MCH RBC QN AUTO: 30 PG
MCHC RBC AUTO-ENTMCNC: 32.9 G/DL
MCV RBC AUTO: 91 FL
MONOCYTES # BLD AUTO: 0.5 K/UL
MONOCYTES NFR BLD: 8.3 %
NEUTROPHILS # BLD AUTO: 3.4 K/UL
NEUTROPHILS NFR BLD: 54.1 %
NRBC BLD-RTO: 0 /100 WBC
PLATELET # BLD AUTO: 255 K/UL
PMV BLD AUTO: 9.6 FL
POTASSIUM SERPL-SCNC: 3.8 MMOL/L
PROT SERPL-MCNC: 7.6 G/DL
RBC # BLD AUTO: 4.53 M/UL
SODIUM SERPL-SCNC: 134 MMOL/L
WBC # BLD AUTO: 6.25 K/UL

## 2018-10-20 PROCEDURE — 96365 THER/PROPH/DIAG IV INF INIT: CPT

## 2018-10-20 PROCEDURE — 81001 URINALYSIS AUTO W/SCOPE: CPT

## 2018-10-20 PROCEDURE — 87077 CULTURE AEROBIC IDENTIFY: CPT

## 2018-10-20 PROCEDURE — 25000003 PHARM REV CODE 250: Performed by: EMERGENCY MEDICINE

## 2018-10-20 PROCEDURE — 96361 HYDRATE IV INFUSION ADD-ON: CPT

## 2018-10-20 PROCEDURE — 87086 URINE CULTURE/COLONY COUNT: CPT

## 2018-10-20 PROCEDURE — 87186 SC STD MICRODIL/AGAR DIL: CPT

## 2018-10-20 PROCEDURE — 85025 COMPLETE CBC W/AUTO DIFF WBC: CPT

## 2018-10-20 PROCEDURE — 99284 EMERGENCY DEPT VISIT MOD MDM: CPT | Mod: ,,, | Performed by: EMERGENCY MEDICINE

## 2018-10-20 PROCEDURE — 87088 URINE BACTERIA CULTURE: CPT

## 2018-10-20 PROCEDURE — 99284 EMERGENCY DEPT VISIT MOD MDM: CPT | Mod: 25

## 2018-10-20 PROCEDURE — 80053 COMPREHEN METABOLIC PANEL: CPT

## 2018-10-20 RX ADMIN — SODIUM CHLORIDE 1000 ML: 0.9 INJECTION, SOLUTION INTRAVENOUS at 10:10

## 2018-10-21 VITALS
BODY MASS INDEX: 37.89 KG/M2 | SYSTOLIC BLOOD PRESSURE: 118 MMHG | OXYGEN SATURATION: 97 % | TEMPERATURE: 98 F | HEIGHT: 68 IN | HEART RATE: 76 BPM | WEIGHT: 250 LBS | RESPIRATION RATE: 20 BRPM | DIASTOLIC BLOOD PRESSURE: 60 MMHG

## 2018-10-21 LAB
BACTERIA #/AREA URNS AUTO: ABNORMAL /HPF
BILIRUB UR QL STRIP: NEGATIVE
CLARITY UR REFRACT.AUTO: ABNORMAL
COLOR UR AUTO: ABNORMAL
GLUCOSE UR QL STRIP: ABNORMAL
HGB UR QL STRIP: ABNORMAL
HYALINE CASTS UR QL AUTO: 0 /LPF
KETONES UR QL STRIP: NEGATIVE
LEUKOCYTE ESTERASE UR QL STRIP: ABNORMAL
MICROSCOPIC COMMENT: ABNORMAL
NITRITE UR QL STRIP: NEGATIVE
PH UR STRIP: 6 [PH] (ref 5–8)
PROT UR QL STRIP: ABNORMAL
RBC #/AREA URNS AUTO: >100 /HPF (ref 0–4)
SP GR UR STRIP: 1.01 (ref 1–1.03)
SQUAMOUS #/AREA URNS AUTO: 10 /HPF
URN SPEC COLLECT METH UR: ABNORMAL
UROBILINOGEN UR STRIP-ACNC: NEGATIVE EU/DL
WBC #/AREA URNS AUTO: >100 /HPF (ref 0–5)
WBC CLUMPS UR QL AUTO: ABNORMAL

## 2018-10-21 PROCEDURE — 63600175 PHARM REV CODE 636 W HCPCS: Performed by: EMERGENCY MEDICINE

## 2018-10-21 RX ORDER — AMOXICILLIN AND CLAVULANATE POTASSIUM 500; 125 MG/1; MG/1
1 TABLET, FILM COATED ORAL 2 TIMES DAILY
Qty: 20 TABLET | Refills: 0 | Status: SHIPPED | OUTPATIENT
Start: 2018-10-21 | End: 2018-10-31

## 2018-10-21 RX ADMIN — CEFTRIAXONE 2 G: 2 INJECTION, SOLUTION INTRAVENOUS at 12:10

## 2018-10-21 NOTE — ED PROVIDER NOTES
"Encounter Date: 10/20/2018    SCRIBE #1 NOTE: I, Cristhian Mayo, am scribing for, and in the presence of,  Haseeb Paige M.D. . I have scribed the following portions of the note - the EKG reading and the Resident attestation.       History     Chief Complaint   Patient presents with    Vaginal Bleeding     Pt presents to ED with c/o hematuria x 2 days. Pt takes xarelto- stopped taking Friday night. Pt c/o dysuria. Pt c/o large amounts of blood when she urinates, but also has saturated "80 pads" this past week. Pt AAOx4. +HX of hysterectomy.      Patient is 70-year-old female history of overactive bladder, IDDM, hypertension, hyperlipidemia, prior colon cancer with ileostomy in place, s/p hysterectomy, AFib rate controlled with metoprolol anticoagulated with rivaroxaban, patient is known to the Urology service and followed by Dr. Ramos last seen 05/2017.  Patient presents emerged part today with lower abdominal cramping, dysuria, hematuria over the past 2 days.  Patient denies any associated symptoms. Patient is having regular output per her ileostomy.  No blood in her ostomy or melena.  No nausea, vomiting. Patient further denies any chest pain, palpitations, shortness of breath.  Patient has a fevers at home.  Patient states that there is blood in her urine every time she urinates per endorses continued issues with emptying.  Patient has not taking her Xarelto for the past 2 days.  Further nurse at taking her metoprolol for AFib this evening.  No known exacerbating or relieving factors.            Review of patient's allergies indicates:  No Known Allergies  Past Medical History:   Diagnosis Date    Atrial fibrillation     Cancer     colon & rectum     Diabetes mellitus, type 2     Hernia     left abd     Hyperlipidemia     Hypertension     Thyroid disease 1986    was on thyroid medication for 20 years and      Past Surgical History:   Procedure Laterality Date    CHOLECYSTECTOMY      1999    COLON " SURGERY      twisted colon dr [place]    CYSTOSCOPY  4/13/2017    [facility]formed [facility] at [facility]ST [facility]    FLUORO URODYNAMIC STUDY (FUDS) N/A 5/30/2017    Performed by [facility] at [facility] OR [facility]ST FLR    HYSTERECTOMY      1969    TONSILLECTOMY       Family History   Problem Relation Age of Onset    Cancer Father      Social History     Tobacco Use    Smoking status: Never Smoker   Substance Use Topics    Alcohol use: Yes     Alcohol/week: 1.8 oz     Types: 1 Glasses of wine, 1 Cans of beer, 1 Shots of liquor per week     Comment: 2 beer per month    Drug use: No     Review of Systems   Constitutional: Negative for chills and fever.   HENT: Negative for congestion.    Eyes: Negative for visual disturbance.   Respiratory: Negative for cough, shortness of breath and wheezing.    Cardiovascular: Negative for chest pain.   Gastrointestinal: Positive for abdominal pain (Lower). Negative for abdominal distention, blood in stool, constipation, diarrhea, nausea and vomiting.   Genitourinary: Positive for dysuria and hematuria.   Musculoskeletal: Negative for myalgias.   Skin: Negative for rash.   Neurological: Negative for headaches.       Physical Exam     Initial Vitals [10/20/18 2114]   BP Pulse Resp Temp SpO2   133/89 104 19 98 °F (36.7 °C) 96 %      MAP       --         Physical Exam    Nursing note and vitals reviewed.  Constitutional: She appears well-developed and well-nourished. No distress.   HENT:   Head: Normocephalic and atraumatic.   Eyes: EOM are normal. Pupils are equal, round, and reactive to light.   Neck: Normal range of motion. Neck supple.   Cardiovascular: Exam reveals no gallop and no friction rub.    No murmur heard.  Tachycardic.  Irregularly, irregular rhythm.   Pulmonary/Chest: Breath sounds normal. No respiratory distress. She has no wheezes. She has no rhonchi. She has no rales.   Abdominal: Soft. She exhibits no distension. There is tenderness (Diffusely). There is  no rebound and no guarding.   Obese.  Ileostomy in place in the right lower quadrant.  Brownish output.  No blood in the ostomy bag.   Musculoskeletal: She exhibits no edema.   Neurological: She is alert and oriented to person, place, and time. No cranial nerve deficit.   Skin: Skin is warm and dry. No rash noted.   Psychiatric: She has a normal mood and affect. Her behavior is normal. Thought content normal.         ED Course   Procedures  Labs Reviewed   URINALYSIS, REFLEX TO URINE CULTURE - Abnormal; Notable for the following components:       Result Value    Color, UA Red (*)     Appearance, UA Cloudy (*)     Protein, UA 2+ (*)     Glucose, UA 2+ (*)     Occult Blood UA 3+ (*)     Leukocytes, UA 3+ (*)     All other components within normal limits    Narrative:     orange cup sent   COMPREHENSIVE METABOLIC PANEL - Abnormal; Notable for the following components:    Sodium 134 (*)     CO2 17 (*)     Glucose 313 (*)     BUN, Bld 36 (*)     Creatinine 2.4 (*)     Calcium 8.6 (*)     Albumin 3.3 (*)     ALT 9 (*)     eGFR if  21.6 (*)     eGFR if non  18.8 (*)     All other components within normal limits   URINALYSIS MICROSCOPIC - Abnormal; Notable for the following components:    RBC, UA >100 (*)     WBC, UA >100 (*)     WBC Clumps, UA Many (*)     Bacteria, UA Few (*)     All other components within normal limits    Narrative:     orange cup sent   CULTURE, URINE   CBC W/ AUTO DIFFERENTIAL     EKG Readings: (Independently Interpreted)   Rhythm: Atrial Fibrillation. Heart Rate: 111 bpm. Conduction: RBBB. ST Segments: Normal ST Segments. Axis: Left Axis Deviation. Clinical Impression: Atrial Fibrillation with RVR          Medical Decision Making:   Independently Interpreted Test(s):   I have ordered and independently interpreted EKG Reading(s) - see prior notes  Clinical Tests:   Medical Tests: Ordered and Reviewed       APC / Resident Notes:   HOIV LUIS E  This is an emergent evaluation  of 78 y.o. female who presents emergency department with hematuria, dysuria, lower abdominal pain. Vital signs show tachycardia.  Irregularly irregular rhythm. Exam notable for an obese female with some mild diffuse abdominal pain.  Ostomy in place.  DX includes but is not limited to bladder cancer, UTI, urethral injury, urovascular fistula.  Will give IVF as well as patient's home metoprolol here in the emergency department.  UA, CBC, CMP.  Will continue to monitor as imaging results.    Lupillo Holloway MD  PGY-4, [facility]  9:50 PM  10/20/2018    HOIV update:  UA shows evidence of acute cystitis.  Suspect hematuria secondary to this.  Patient given 2 g Rocephin here in the emergency department.  Will discharge with Augmentin.  Patient to follow up with her primary care provider in 3 days. Pt educated regarding results, projected clinical course, and reasons to return to the ER. Pt endorsed understanding. Discharged home in stable condition.    Lupillo Holloway MD  PGY-4, [facility]  1:18 AM  10/21/2018       Scribe Attestation:   Scribe #1: I performed the above scribed service and the documentation accurately describes the services I performed. I attest to the accuracy of the note.    Attending Attestation:   Physician Attestation Statement for Resident:  As the supervising MD   Physician Attestation Statement: I have personally seen and examined this patient.   I agree with the above history. -: 78 y.o. Woman with co morbidities of chronic Afib and anticoagulation presents to the ED for evaluation of hematuria.     As the supervising MD I agree with the above PE.    As the supervising MD I agree with the above treatment, course, plan, and disposition.                       Clinical Impression:   The primary encounter diagnosis was Acute cystitis with hematuria. A diagnosis of Chronic kidney disease, unspecified CKD stage was also pertinent to this visit.      Disposition:   Disposition:  Discharged  Condition: Stable                        Marko Holloway MD  Resident  10/21/18 0118       Haseeb Paige MD  10/21/18 0685

## 2018-10-21 NOTE — ED NOTES
Hourly rounding complete. Patient resting in stretcher and is in NAD at this time. Pt is awake alert and oriented x4, VSS, respirations even and unlabored. Pt denies pain at this time. Pt updated on POC. Bed low and locked with side rails up x2, call bell in pt reach. Pt voices no needs at this time. Family bedside

## 2018-10-21 NOTE — ED TRIAGE NOTES
Rhiannon Martínez, a 78 y.o. female presents to the ED via private auto with CC hematuria, dysuria x 2 days.      Patient identifiers verified verbally with patient and correct for Rhiannon Martínez.    LOC/ APPEARANCE: The patient is awake, alert and oriented x 4. Pt is speaking appropriately, no slurred speech. Patient resting comfortably and in no acute distress. Pt is clean and well groomed. No JVD visible. Pt reports pain level of 0. Pt updated on POC. Bed low and locked with side rails up x2, call bell in pt reach.  SKIN: Skin is warm dry and intact, and color is consistent with ethnicity. Capillary refill <3 seconds. No breakdown or brusing visible and mucus membranes moist and acyanotic.  MUSCULOSKELETAL: Full range of motion present in all extremities. Hand  equal and leg strength strong +5 bilaterally.  RESPIRATORY: Airway is open and patent. Respirations-unlabored, regular rate, equal bilaterally on inspiration and expiration. No accessory muscle use noted. Lungs clear to auscultation in all fields bilaterally anterior and posterior.   CARDIAC: Patient has regular heart rate.  No peripheral edema noted, and patient has no c/o chest pain. Peripheral pulses present equal and strong throughout.  ABDOMEN: Soft and non-tender to palpation with no distention noted. Normoactive bowel sounds x4 quadrants. Pt has no complaints of abnormal bowel movements, denies blood in stool. Pt reports normal appetite.   NEUROLOGIC: Eyes open spontaneously and facial expression symmetrical. Pt behavior appropriate to situation, and pt follows commands.  Pt reports sensation present in all extremities when touched with a finger. PERRLA  : No complaints of frequency, burning, urgency or blood in the urine. No complaints of incontinence.

## 2018-10-21 NOTE — ED NOTES
Assisted pt to restroom to obtain urine sample via clean catch. Daughter in restroom with pt and instructed to use call light when finished.

## 2018-10-22 ENCOUNTER — PES CALL (OUTPATIENT)
Dept: ADMINISTRATIVE | Facility: CLINIC | Age: 78
End: 2018-10-22

## 2018-10-22 LAB — BACTERIA UR CULT: NORMAL

## 2019-09-27 PROBLEM — E11.00 HYPEROSMOLAR NON-KETOTIC STATE IN PATIENT WITH TYPE 2 DIABETES MELLITUS: Status: ACTIVE | Noted: 2019-09-27

## 2019-09-27 PROBLEM — B37.2 YEAST DERMATITIS: Status: ACTIVE | Noted: 2019-09-27

## 2019-09-27 PROBLEM — E11.00 TYPE 2 DIABETES MELLITUS WITH HYPEROSMOLAR NONKETOTIC HYPERGLYCEMIA: Status: ACTIVE | Noted: 2019-09-27

## 2019-09-29 PROBLEM — R42 DIZZINESS: Status: ACTIVE | Noted: 2019-09-29

## 2020-05-22 PROBLEM — E87.1 HYPONATREMIA: Status: ACTIVE | Noted: 2020-05-22

## 2020-05-22 PROBLEM — E87.5 HYPERKALEMIA: Status: ACTIVE | Noted: 2020-05-22

## 2020-05-22 PROBLEM — N17.9 AKI (ACUTE KIDNEY INJURY): Status: ACTIVE | Noted: 2020-05-22

## 2020-05-23 PROBLEM — E87.5 HYPERKALEMIA: Status: RESOLVED | Noted: 2020-05-22 | Resolved: 2020-05-23

## 2020-05-23 PROBLEM — F43.21 GRIEF REACTION: Status: ACTIVE | Noted: 2020-05-23

## 2020-05-23 PROBLEM — N39.0 UTI (URINARY TRACT INFECTION): Status: ACTIVE | Noted: 2020-05-23

## 2020-05-26 PROBLEM — S90.32XA TRAUMATIC HEMATOMA OF LEFT FOOT: Status: ACTIVE | Noted: 2020-05-26

## 2020-05-26 PROBLEM — R42 DIZZINESS: Status: RESOLVED | Noted: 2019-09-29 | Resolved: 2020-05-26

## 2020-06-05 ENCOUNTER — PATIENT OUTREACH (OUTPATIENT)
Dept: ADMINISTRATIVE | Facility: CLINIC | Age: 80
End: 2020-06-05

## 2020-06-05 RX ORDER — DIPHENHYDRAMINE HCL 50 MG
50 CAPSULE ORAL EVERY 6 HOURS PRN
COMMUNITY

## 2020-06-05 NOTE — PATIENT INSTRUCTIONS
"  Discharge Instructions for Hyperkalemia  You have been diagnosed with hyperkalemia (a high level of potassium in the blood). Potassium is important to the function of the nerve and muscle cells, including the cells of the heart. But a high level of potassium in the blood can cause  serious problems such as abnormal heart rhythms and even heart attack.  Diet changes  · Eat less of these potassium-rich foods:  ¨ Bananas (avoid bananas completely)  ¨ Apricots, fresh or dried  ¨ Oranges and orange juice  ¨ Grapefruit juice  ¨ Tomatoes, tomato sauce, and tomato juice  ¨ Spinach  ¨ Green, leafy vegetables, including salad greens, kale, broccoli, chard, and collards  ¨ Melons (all kinds)  ¨ Peas  ¨ Beans  ¨ Potatoes  ¨ Sweet potatoes  ¨ Avocados and guacamole  ¨ Vegetable juice (homemade or store-bought) and vegetable juice cocktail  ¨ Fruit juices  ¨ Nuts, including pistachios, almonds, peanuts, hazelnuts, [place], cashew, mixed  ¨ "Lite" or reduced sodium salt  Other home care  · Tell your healthcare provider about all prescription and over-the-counter medicines you are taking. Certain medicines can increase potassium levels.  · Take all medicines exactly as directed.  · Have your potassium levels checked regularly.  · Keep all follow-up appointments. Your healthcare provider needs to monitor your condition closely.  · Learn to take your own pulse. If your pulse is less than 60 beats per minute or irregular, call your provider.  Follow-up  Make a follow-up appointment as directed by our staff.     When to Call Your healthcare provider  Call your provider right away if you have any of the following:  · Chest pain (call 911)  · Fainting (call 911)  · Shortness of breath (call 911 if severe)  · Slow, irregular heartbeat  · Fatigue  · Dizziness  · Lightheadedness  · Confusion   Date Last Reviewed: 6/19/2015  © 3230-2268 The "[employer]. [address] [place] [address], [place] [address]. All rights reserved. This " information is not intended as a substitute for professional medical care. Always follow your healthcare professional's instructions.

## 2020-06-09 ENCOUNTER — LAB VISIT (OUTPATIENT)
Dept: LAB | Facility: HOSPITAL | Age: 80
End: 2020-06-09
Attending: INTERNAL MEDICINE
Payer: MEDICARE

## 2020-06-09 DIAGNOSIS — I11.0 HYPERTENSIVE HEART DISEASE WITH CONGESTIVE HEART FAILURE: ICD-10-CM

## 2020-06-09 DIAGNOSIS — D64.9 ANEMIA, UNSPECIFIED: Primary | ICD-10-CM

## 2020-06-09 LAB
ALBUMIN SERPL BCP-MCNC: 2.5 G/DL (ref 3.5–5.2)
ALP SERPL-CCNC: 69 U/L (ref 55–135)
ALT SERPL W/O P-5'-P-CCNC: 11 U/L (ref 10–44)
ANION GAP SERPL CALC-SCNC: 10 MMOL/L (ref 8–16)
AST SERPL-CCNC: 20 U/L (ref 10–40)
BASOPHILS # BLD AUTO: 0.04 K/UL (ref 0–0.2)
BASOPHILS NFR BLD: 1 % (ref 0–1.9)
BILIRUB SERPL-MCNC: 0.5 MG/DL (ref 0.1–1)
BUN SERPL-MCNC: 24 MG/DL (ref 8–23)
CALCIUM SERPL-MCNC: 8.2 MG/DL (ref 8.7–10.5)
CHLORIDE SERPL-SCNC: 104 MMOL/L (ref 95–110)
CO2 SERPL-SCNC: 21 MMOL/L (ref 23–29)
CREAT SERPL-MCNC: 1.9 MG/DL (ref 0.5–1.4)
DIFFERENTIAL METHOD: ABNORMAL
EOSINOPHIL # BLD AUTO: 0.4 K/UL (ref 0–0.5)
EOSINOPHIL NFR BLD: 8.5 % (ref 0–8)
ERYTHROCYTE [DISTWIDTH] IN BLOOD BY AUTOMATED COUNT: 14 % (ref 11.5–14.5)
EST. GFR  (AFRICAN AMERICAN): 28.3 ML/MIN/1.73 M^2
EST. GFR  (NON AFRICAN AMERICAN): 24.5 ML/MIN/1.73 M^2
GLUCOSE SERPL-MCNC: 100 MG/DL (ref 70–110)
HCT VFR BLD AUTO: 36.1 % (ref 37–48.5)
HGB BLD-MCNC: 11.5 G/DL (ref 12–16)
IMM GRANULOCYTES # BLD AUTO: 0.03 K/UL (ref 0–0.04)
IMM GRANULOCYTES NFR BLD AUTO: 0.7 % (ref 0–0.5)
LYMPHOCYTES # BLD AUTO: 1.6 K/UL (ref 1–4.8)
LYMPHOCYTES NFR BLD: 38.6 % (ref 18–48)
MCH RBC QN AUTO: 28.5 PG (ref 27–31)
MCHC RBC AUTO-ENTMCNC: 31.9 G/DL (ref 32–36)
MCV RBC AUTO: 89 FL (ref 82–98)
MONOCYTES # BLD AUTO: 0.4 K/UL (ref 0.3–1)
MONOCYTES NFR BLD: 10.1 % (ref 4–15)
NEUTROPHILS # BLD AUTO: 1.7 K/UL (ref 1.8–7.7)
NEUTROPHILS NFR BLD: 41.1 % (ref 38–73)
NRBC BLD-RTO: 0 /100 WBC
PLATELET # BLD AUTO: 355 K/UL (ref 150–350)
PMV BLD AUTO: 8.8 FL (ref 9.2–12.9)
POTASSIUM SERPL-SCNC: 4 MMOL/L (ref 3.5–5.1)
PROT SERPL-MCNC: 6 G/DL (ref 6–8.4)
RBC # BLD AUTO: 4.04 M/UL (ref 4–5.4)
SODIUM SERPL-SCNC: 135 MMOL/L (ref 136–145)
WBC # BLD AUTO: 4.14 K/UL (ref 3.9–12.7)

## 2020-06-09 PROCEDURE — 36415 COLL VENOUS BLD VENIPUNCTURE: CPT

## 2020-06-09 PROCEDURE — 85025 COMPLETE CBC W/AUTO DIFF WBC: CPT

## 2020-06-09 PROCEDURE — 80053 COMPREHEN METABOLIC PANEL: CPT

## 2020-07-31 DIAGNOSIS — R94.31 NONSPECIFIC ABNORMAL ELECTROCARDIOGRAM (ECG) (EKG): ICD-10-CM

## 2020-07-31 DIAGNOSIS — R07.0 THROAT PAIN: Primary | ICD-10-CM

## 2020-08-05 ENCOUNTER — TELEPHONE (OUTPATIENT)
Dept: CARDIOLOGY | Facility: HOSPITAL | Age: 80
End: 2020-08-05

## 2020-08-06 ENCOUNTER — DOCUMENTATION ONLY (OUTPATIENT)
Dept: CARDIOLOGY | Facility: HOSPITAL | Age: 80
End: 2020-08-06

## 2020-08-06 NOTE — PROGRESS NOTES
Spoke to pt's daughter who reports that pt is in the hospital at [facility]. Subsequently, spoke to Yaima in Dr Mon's office who requests that we cancel the stress test and scheduling for this pt.

## 2020-12-26 NOTE — PATIENT INSTRUCTIONS
What are Urodynamics Studies?     The bladder holds urine until it leaves the body through the urethra.     Urodynamics studies are a series of tests that give your doctor a close look at the working of your bladder and urethra. The tests can help your doctor learn about any problems storing urine or voiding (eliminating) urine from your body.  Understanding the lower urinary tract  The lower part of the urinary tract has several parts.  · The bladder stores urine until youre ready to release it.  · The urethra is the tube that carries urine from the bladder out of the body.  · The sphincter is made up of muscles around the opening of the bladder. The sphincter muscles tighten to hold urine in the bladder. They relax to let urine flow. Signals from the brain tell the sphincter when to tighten and relax. These signals also tell the bladder when to contract to let urine flow out of the body.  Why you need a urodynamics study  This test may be ordered if you:  · Are incontinent (leak urine).  · Have a bladder that does not empty all the way.  · Have symptoms such as the need to urinate often or a constant strong need to urinate.  · Have intermittent or weak urine stream.  · Have persistent urinary tract infections.  Preparing for the study  · Tell your doctor about any medications youre taking. Ask if you should stop them before the study.  · Keep a diary of your bathroom habits. Do this for a few days before the study. This diary can be a helpful part of the evaluation.  · Ask if you need to arrive for the study with a full bladder.  Date Last Reviewed: 9/12/2014  © 0958-4763 [employer]. [address] [place] [address], [place] [address]. All rights reserved. This information is not intended as a substitute for professional medical care. Always follow your healthcare professional's instructions.        Urodynamic Studies     The equipment used for the study varies depending upon the facility and what tests  are done.     Urodynamic studies may be done in your doctors office, a clinic, or a hospital. The studies may take up to an hour or more. This depends on which tests your doctor does. The tests are generally painless. You wont need sedating medication.  Tests that may be done  Uroflowmetry. This measures the amount and speed of urine you void from your bladder. You urinate into a funnel. Its attached to a computer that records your urine flow over time. The amount of urine left in your bladder after you void may also be measured right after this test.  Cystometry. This test evaluates how much your bladder can hold. It also measures how strong your bladder muscle is and how well the signals work that tell you when your bladder is full. Your health care provider fills your bladder with sterile water or saline solution, through a catheter. Your doctor will instruct you to report any sensations you feel. Mention if theyre similar to symptoms youve felt at home. Your doctor may ask you to cough, stand and walk, or bear down during this test.  Electromyogram. This helps evaluate the muscle contractions that control urination, such as sphincter muscle contractions. Your health care provider may place electrode patches or wires near your rectum or urethra to make the recording. He or she may ask you to try to tighten or relax your sphincter muscles during this test.  Pressure flow study. This test measures your detrusor, urethral, and abdominal pressures. Detrusor is the muscle surrounding the bladder walls that relaxes to allow your bladder to fill, and and contracts to squeeze out urine. A pressure flow study is often done after cystometry. Youre asked to urinate while a probe in your urethra measures pressures.  Video cystourethrography. This takes video pictures of urine flow through your urinary tract. It can help identify blockages or other problems. The bladder is filled with an X-ray contrast fluid. Then X-ray  video pictures are taken as the fluid is urinated out. Ultrasound imaging may also be combined with routine urodynamic studies.  Ambulatory urodynamics. This test can be used to evaluate you while doing usual activities.  Getting your results  After the study, youll get dressed and return to the consultation room. Test results may be ready soon after the study is finished. Or, you may return to your doctors office in a few days for your results. Your doctor can talk with you about the study report and your options.   Date Last Reviewed: 9/12/2014  © 4963-2092 [email or web address]. [address] [place] [address]. All rights reserved. This information is not intended as a substitute for professional medical care. Always follow your healthcare professional's instructions.         [Current] : current smoker [_____ pack-years] : [unfilled] pack-years [TextBox_13] : She denies hemoptysis, denies new cough, denies unexplained weight loss.\par She is a current smoker with a 30 pack year smoking history (0.6PPD x 50 years).  She has a h/o breast cancer (2000 and 2019; s/p sx and radiation).  She is referred by Dr. Arely Leonard.\par \par

## 2021-03-19 ENCOUNTER — DOCUMENT SCAN (OUTPATIENT)
Dept: HOME HEALTH SERVICES | Facility: HOSPITAL | Age: 81
End: 2021-03-19
Payer: MEDICARE

## (undated) DEVICE — PACK CYSTO

## (undated) DEVICE — SET CYSTO IRRIGATING

## (undated) DEVICE — TRAY CYSTO BASIN

## (undated) DEVICE — GOWN SURGICAL X-LARGE